# Patient Record
Sex: MALE | Race: WHITE | NOT HISPANIC OR LATINO
[De-identification: names, ages, dates, MRNs, and addresses within clinical notes are randomized per-mention and may not be internally consistent; named-entity substitution may affect disease eponyms.]

---

## 2017-03-21 ENCOUNTER — APPOINTMENT (OUTPATIENT)
Dept: VASCULAR SURGERY | Facility: CLINIC | Age: 52
End: 2017-03-21

## 2017-03-21 VITALS — BODY MASS INDEX: 24.38 KG/M2 | WEIGHT: 180 LBS | HEIGHT: 72 IN

## 2017-03-21 VITALS
SYSTOLIC BLOOD PRESSURE: 131 MMHG | HEART RATE: 66 BPM | DIASTOLIC BLOOD PRESSURE: 79 MMHG | TEMPERATURE: 98.3 F | RESPIRATION RATE: 16 BRPM

## 2017-03-21 DIAGNOSIS — I87.2 VENOUS INSUFFICIENCY (CHRONIC) (PERIPHERAL): ICD-10-CM

## 2017-03-21 DIAGNOSIS — Z78.9 OTHER SPECIFIED HEALTH STATUS: ICD-10-CM

## 2017-03-21 DIAGNOSIS — I86.8 VARICOSE VEINS OF OTHER SPECIFIED SITES: ICD-10-CM

## 2017-03-27 PROBLEM — Z78.9 NON-SMOKER: Status: ACTIVE | Noted: 2017-03-21

## 2017-03-27 PROBLEM — Z78.9 SOCIAL ALCOHOL USE: Status: ACTIVE | Noted: 2017-03-21

## 2017-03-27 PROBLEM — I86.8 VARICOSE VEINS: Status: ACTIVE | Noted: 2017-03-21

## 2017-05-25 ENCOUNTER — APPOINTMENT (OUTPATIENT)
Age: 52
End: 2017-05-25

## 2017-06-08 ENCOUNTER — APPOINTMENT (OUTPATIENT)
Age: 52
End: 2017-06-08

## 2017-06-08 ENCOUNTER — LABORATORY RESULT (OUTPATIENT)
Age: 52
End: 2017-06-08

## 2017-06-08 ENCOUNTER — RESULT REVIEW (OUTPATIENT)
Age: 52
End: 2017-06-08

## 2017-06-15 ENCOUNTER — APPOINTMENT (OUTPATIENT)
Dept: VASCULAR SURGERY | Facility: CLINIC | Age: 52
End: 2017-06-15

## 2017-06-15 DIAGNOSIS — Z98.890 OTHER SPECIFIED POSTPROCEDURAL STATES: ICD-10-CM

## 2017-07-14 ENCOUNTER — APPOINTMENT (OUTPATIENT)
Dept: ORTHOPEDIC SURGERY | Facility: CLINIC | Age: 52
End: 2017-07-14
Payer: COMMERCIAL

## 2017-07-14 VITALS
HEART RATE: 40 BPM | WEIGHT: 180 LBS | BODY MASS INDEX: 24.38 KG/M2 | SYSTOLIC BLOOD PRESSURE: 152 MMHG | DIASTOLIC BLOOD PRESSURE: 84 MMHG | HEIGHT: 72 IN

## 2017-07-14 DIAGNOSIS — M25.519 PAIN IN UNSPECIFIED SHOULDER: ICD-10-CM

## 2017-07-14 DIAGNOSIS — M75.81 OTHER SHOULDER LESIONS, RIGHT SHOULDER: ICD-10-CM

## 2017-07-14 PROCEDURE — 20610 DRAIN/INJ JOINT/BURSA W/O US: CPT | Mod: RT

## 2017-07-14 PROCEDURE — 73030 X-RAY EXAM OF SHOULDER: CPT | Mod: RT

## 2017-07-14 PROCEDURE — 99203 OFFICE O/P NEW LOW 30 MIN: CPT | Mod: 25

## 2018-02-21 ENCOUNTER — OUTPATIENT (OUTPATIENT)
Dept: OUTPATIENT SERVICES | Facility: HOSPITAL | Age: 53
LOS: 1 days | End: 2018-02-21
Payer: COMMERCIAL

## 2018-02-21 DIAGNOSIS — M50.00 CERVICAL DISC DISORDER WITH MYELOPATHY, UNSPECIFIED CERVICAL REGION: ICD-10-CM

## 2018-02-21 PROCEDURE — 72141 MRI NECK SPINE W/O DYE: CPT | Mod: 26

## 2018-02-21 PROCEDURE — 72141 MRI NECK SPINE W/O DYE: CPT

## 2018-03-14 ENCOUNTER — OUTPATIENT (OUTPATIENT)
Dept: OUTPATIENT SERVICES | Facility: HOSPITAL | Age: 53
LOS: 1 days | Discharge: HOME | End: 2018-03-14
Payer: COMMERCIAL

## 2018-03-14 DIAGNOSIS — M54.12 RADICULOPATHY, CERVICAL REGION: ICD-10-CM

## 2018-03-14 PROCEDURE — 62321 NJX INTERLAMINAR CRV/THRC: CPT

## 2018-03-14 PROCEDURE — 77003 FLUOROGUIDE FOR SPINE INJECT: CPT

## 2018-07-24 PROBLEM — I87.2 VENOUS INSUFFICIENCY: Status: ACTIVE | Noted: 2017-03-21

## 2019-06-17 ENCOUNTER — OUTPATIENT (OUTPATIENT)
Dept: OUTPATIENT SERVICES | Facility: HOSPITAL | Age: 54
LOS: 1 days | End: 2019-06-17
Payer: COMMERCIAL

## 2019-06-17 DIAGNOSIS — Z00.00 ENCOUNTER FOR GENERAL ADULT MEDICAL EXAMINATION WITHOUT ABNORMAL FINDINGS: ICD-10-CM

## 2019-06-17 DIAGNOSIS — R10.9 UNSPECIFIED ABDOMINAL PAIN: ICD-10-CM

## 2019-06-17 PROCEDURE — 74177 CT ABD & PELVIS W/CONTRAST: CPT

## 2019-06-17 PROCEDURE — 74177 CT ABD & PELVIS W/CONTRAST: CPT | Mod: 26

## 2019-07-03 ENCOUNTER — OUTPATIENT (OUTPATIENT)
Dept: OUTPATIENT SERVICES | Facility: HOSPITAL | Age: 54
LOS: 1 days | End: 2019-07-03
Payer: COMMERCIAL

## 2019-07-03 DIAGNOSIS — B35.1 TINEA UNGUIUM: ICD-10-CM

## 2019-07-03 LAB
ALT FLD-CCNC: 29 U/L — SIGNIFICANT CHANGE UP (ref 10–45)
AST SERPL-CCNC: 26 U/L — SIGNIFICANT CHANGE UP (ref 10–40)
HCT VFR BLD CALC: 41.5 % — SIGNIFICANT CHANGE UP (ref 39–50)
HGB BLD-MCNC: 13.5 G/DL — SIGNIFICANT CHANGE UP (ref 13–17)
MCHC RBC-ENTMCNC: 30.3 PG — SIGNIFICANT CHANGE UP (ref 27–34)
MCHC RBC-ENTMCNC: 32.5 GM/DL — SIGNIFICANT CHANGE UP (ref 32–36)
MCV RBC AUTO: 93 FL — SIGNIFICANT CHANGE UP (ref 80–100)
PLATELET # BLD AUTO: 266 K/UL — SIGNIFICANT CHANGE UP (ref 150–400)
RBC # BLD: 4.46 M/UL — SIGNIFICANT CHANGE UP (ref 4.2–5.8)
RBC # FLD: 12.7 % — SIGNIFICANT CHANGE UP (ref 10.3–14.5)
WBC # BLD: 5.3 K/UL — SIGNIFICANT CHANGE UP (ref 3.8–10.5)
WBC # FLD AUTO: 5.3 K/UL — SIGNIFICANT CHANGE UP (ref 3.8–10.5)

## 2019-07-03 PROCEDURE — 84460 ALANINE AMINO (ALT) (SGPT): CPT

## 2019-07-03 PROCEDURE — 85027 COMPLETE CBC AUTOMATED: CPT

## 2019-07-03 PROCEDURE — 84450 TRANSFERASE (AST) (SGOT): CPT

## 2020-03-25 ENCOUNTER — TRANSCRIPTION ENCOUNTER (OUTPATIENT)
Age: 55
End: 2020-03-25

## 2021-01-23 ENCOUNTER — TRANSCRIPTION ENCOUNTER (OUTPATIENT)
Age: 56
End: 2021-01-23

## 2021-02-01 ENCOUNTER — INPATIENT (INPATIENT)
Facility: HOSPITAL | Age: 56
LOS: 0 days | Discharge: HOME | DRG: 310 | End: 2021-02-02
Attending: INTERNAL MEDICINE | Admitting: HOSPITALIST
Payer: COMMERCIAL

## 2021-02-01 VITALS
HEIGHT: 66 IN | RESPIRATION RATE: 18 BRPM | OXYGEN SATURATION: 100 % | HEART RATE: 116 BPM | DIASTOLIC BLOOD PRESSURE: 105 MMHG | SYSTOLIC BLOOD PRESSURE: 177 MMHG | TEMPERATURE: 98 F | WEIGHT: 160.06 LBS

## 2021-02-01 RX ORDER — METOPROLOL TARTRATE 50 MG
5 TABLET ORAL ONCE
Refills: 0 | Status: DISCONTINUED | OUTPATIENT
Start: 2021-02-01 | End: 2021-02-01

## 2021-02-01 RX ORDER — DILTIAZEM HCL 120 MG
10 CAPSULE, EXT RELEASE 24 HR ORAL ONCE
Refills: 0 | Status: COMPLETED | OUTPATIENT
Start: 2021-02-01 | End: 2021-02-01

## 2021-02-01 NOTE — ED ADULT TRIAGE NOTE - NS ED NURSE AMBULANCES
[address] [facility] Patient Status:  Surgery Admit - Inpt    1957 MRN [id number]   Location [place] [address] Attending Roma Munroe, *   Hosp Day # 0 PCP Cira Gabriel MD in HPI. Physical Exam:   Vital Signs:  Height 5' 9\" (1.753 m), weight 210 lb (95.3 kg). General appearance: alert, appears stated age and cooperative  Extremities:  On exam he has a slightly antalgic limp on the right side.  He has 15 degrees of int [facility]

## 2021-02-01 NOTE — ED ADULT TRIAGE NOTE - CHIEF COMPLAINT QUOTE
pt co palpitations s/p shoveling snow. Took 2 81mg ASA at home. Given another 2 81mg ASA by EMS. denies cardiac hx.

## 2021-02-02 ENCOUNTER — TRANSCRIPTION ENCOUNTER (OUTPATIENT)
Age: 56
End: 2021-02-02

## 2021-02-02 VITALS — HEART RATE: 48 BPM

## 2021-02-02 DIAGNOSIS — I48.91 UNSPECIFIED ATRIAL FIBRILLATION: ICD-10-CM

## 2021-02-02 DIAGNOSIS — Z98.890 OTHER SPECIFIED POSTPROCEDURAL STATES: Chronic | ICD-10-CM

## 2021-02-02 LAB
ADD ON TEST-SPECIMEN IN LAB: SIGNIFICANT CHANGE UP
ALBUMIN SERPL ELPH-MCNC: 3.9 G/DL — SIGNIFICANT CHANGE UP (ref 3.3–5)
ALP SERPL-CCNC: 35 U/L — LOW (ref 40–120)
ALT FLD-CCNC: 49 U/L — SIGNIFICANT CHANGE UP (ref 12–78)
ANION GAP SERPL CALC-SCNC: 3 MMOL/L — LOW (ref 5–17)
ANION GAP SERPL CALC-SCNC: 8 MMOL/L — SIGNIFICANT CHANGE UP (ref 5–17)
APPEARANCE UR: CLEAR — SIGNIFICANT CHANGE UP
AST SERPL-CCNC: 40 U/L — HIGH (ref 15–37)
BASOPHILS # BLD AUTO: 0.06 K/UL — SIGNIFICANT CHANGE UP (ref 0–0.2)
BASOPHILS NFR BLD AUTO: 1 % — SIGNIFICANT CHANGE UP (ref 0–2)
BILIRUB SERPL-MCNC: 0.3 MG/DL — SIGNIFICANT CHANGE UP (ref 0.2–1.2)
BILIRUB UR-MCNC: NEGATIVE — SIGNIFICANT CHANGE UP
BUN SERPL-MCNC: 22 MG/DL — SIGNIFICANT CHANGE UP (ref 7–23)
BUN SERPL-MCNC: 23 MG/DL — SIGNIFICANT CHANGE UP (ref 7–23)
CALCIUM SERPL-MCNC: 8.3 MG/DL — LOW (ref 8.5–10.1)
CALCIUM SERPL-MCNC: 9.4 MG/DL — SIGNIFICANT CHANGE UP (ref 8.5–10.1)
CHLORIDE SERPL-SCNC: 112 MMOL/L — HIGH (ref 96–108)
CHLORIDE SERPL-SCNC: 112 MMOL/L — HIGH (ref 96–108)
CK SERPL-CCNC: 694 U/L — HIGH (ref 26–308)
CO2 SERPL-SCNC: 25 MMOL/L — SIGNIFICANT CHANGE UP (ref 22–31)
CO2 SERPL-SCNC: 29 MMOL/L — SIGNIFICANT CHANGE UP (ref 22–31)
COLOR SPEC: YELLOW — SIGNIFICANT CHANGE UP
CREAT SERPL-MCNC: 0.97 MG/DL — SIGNIFICANT CHANGE UP (ref 0.5–1.3)
CREAT SERPL-MCNC: 1.04 MG/DL — SIGNIFICANT CHANGE UP (ref 0.5–1.3)
DIFF PNL FLD: NEGATIVE — SIGNIFICANT CHANGE UP
EOSINOPHIL # BLD AUTO: 0.07 K/UL — SIGNIFICANT CHANGE UP (ref 0–0.5)
EOSINOPHIL NFR BLD AUTO: 1.1 % — SIGNIFICANT CHANGE UP (ref 0–6)
GLUCOSE SERPL-MCNC: 101 MG/DL — HIGH (ref 70–99)
GLUCOSE SERPL-MCNC: 120 MG/DL — HIGH (ref 70–99)
GLUCOSE UR QL: NEGATIVE MG/DL — SIGNIFICANT CHANGE UP
HCT VFR BLD CALC: 41.5 % — SIGNIFICANT CHANGE UP (ref 39–50)
HCV AB S/CO SERPL IA: 0.08 S/CO — SIGNIFICANT CHANGE UP (ref 0–0.99)
HCV AB SERPL-IMP: SIGNIFICANT CHANGE UP
HGB BLD-MCNC: 14.2 G/DL — SIGNIFICANT CHANGE UP (ref 13–17)
IMM GRANULOCYTES NFR BLD AUTO: 0.3 % — SIGNIFICANT CHANGE UP (ref 0–1.5)
KETONES UR-MCNC: NEGATIVE — SIGNIFICANT CHANGE UP
LEUKOCYTE ESTERASE UR-ACNC: NEGATIVE — SIGNIFICANT CHANGE UP
LYMPHOCYTES # BLD AUTO: 1.74 K/UL — SIGNIFICANT CHANGE UP (ref 1–3.3)
LYMPHOCYTES # BLD AUTO: 27.8 % — SIGNIFICANT CHANGE UP (ref 13–44)
MAGNESIUM SERPL-MCNC: 2.5 MG/DL — SIGNIFICANT CHANGE UP (ref 1.6–2.6)
MCHC RBC-ENTMCNC: 30.5 PG — SIGNIFICANT CHANGE UP (ref 27–34)
MCHC RBC-ENTMCNC: 34.2 GM/DL — SIGNIFICANT CHANGE UP (ref 32–36)
MCV RBC AUTO: 89.1 FL — SIGNIFICANT CHANGE UP (ref 80–100)
MONOCYTES # BLD AUTO: 0.48 K/UL — SIGNIFICANT CHANGE UP (ref 0–0.9)
MONOCYTES NFR BLD AUTO: 7.7 % — SIGNIFICANT CHANGE UP (ref 2–14)
NEUTROPHILS # BLD AUTO: 3.89 K/UL — SIGNIFICANT CHANGE UP (ref 1.8–7.4)
NEUTROPHILS NFR BLD AUTO: 62.1 % — SIGNIFICANT CHANGE UP (ref 43–77)
NITRITE UR-MCNC: NEGATIVE — SIGNIFICANT CHANGE UP
NT-PROBNP SERPL-SCNC: 45 PG/ML — SIGNIFICANT CHANGE UP (ref 0–125)
PH UR: 7 — SIGNIFICANT CHANGE UP (ref 5–8)
PHOSPHATE SERPL-MCNC: 4.4 MG/DL — SIGNIFICANT CHANGE UP (ref 2.5–4.5)
PLATELET # BLD AUTO: 260 K/UL — SIGNIFICANT CHANGE UP (ref 150–400)
POTASSIUM SERPL-MCNC: 3.4 MMOL/L — LOW (ref 3.5–5.3)
POTASSIUM SERPL-MCNC: 4.6 MMOL/L — SIGNIFICANT CHANGE UP (ref 3.5–5.3)
POTASSIUM SERPL-SCNC: 3.4 MMOL/L — LOW (ref 3.5–5.3)
POTASSIUM SERPL-SCNC: 4.6 MMOL/L — SIGNIFICANT CHANGE UP (ref 3.5–5.3)
PROT SERPL-MCNC: 6.8 GM/DL — SIGNIFICANT CHANGE UP (ref 6–8.3)
PROT UR-MCNC: NEGATIVE MG/DL — SIGNIFICANT CHANGE UP
RBC # BLD: 4.66 M/UL — SIGNIFICANT CHANGE UP (ref 4.2–5.8)
RBC # FLD: 13 % — SIGNIFICANT CHANGE UP (ref 10.3–14.5)
SARS-COV-2 IGG SERPL QL IA: NEGATIVE — SIGNIFICANT CHANGE UP
SARS-COV-2 IGM SERPL IA-ACNC: 0.08 INDEX — SIGNIFICANT CHANGE UP
SARS-COV-2 RNA SPEC QL NAA+PROBE: SIGNIFICANT CHANGE UP
SODIUM SERPL-SCNC: 144 MMOL/L — SIGNIFICANT CHANGE UP (ref 135–145)
SODIUM SERPL-SCNC: 145 MMOL/L — SIGNIFICANT CHANGE UP (ref 135–145)
SP GR SPEC: 1.01 — SIGNIFICANT CHANGE UP (ref 1.01–1.02)
T4 FREE SERPL-MCNC: 0.75 NG/DL — LOW (ref 0.76–1.46)
TROPONIN I SERPL-MCNC: 0.02 NG/ML — SIGNIFICANT CHANGE UP (ref 0.01–0.04)
TROPONIN I SERPL-MCNC: 0.02 NG/ML — SIGNIFICANT CHANGE UP (ref 0.01–0.04)
TROPONIN I SERPL-MCNC: <0.015 NG/ML — SIGNIFICANT CHANGE UP (ref 0.01–0.04)
TSH SERPL-MCNC: 1.79 UU/ML — SIGNIFICANT CHANGE UP (ref 0.34–4.82)
UROBILINOGEN FLD QL: NEGATIVE MG/DL — SIGNIFICANT CHANGE UP
WBC # BLD: 6.26 K/UL — SIGNIFICANT CHANGE UP (ref 3.8–10.5)
WBC # FLD AUTO: 6.26 K/UL — SIGNIFICANT CHANGE UP (ref 3.8–10.5)

## 2021-02-02 PROCEDURE — 84100 ASSAY OF PHOSPHORUS: CPT

## 2021-02-02 PROCEDURE — 93010 ELECTROCARDIOGRAM REPORT: CPT

## 2021-02-02 PROCEDURE — 71045 X-RAY EXAM CHEST 1 VIEW: CPT | Mod: 26

## 2021-02-02 PROCEDURE — 93005 ELECTROCARDIOGRAM TRACING: CPT

## 2021-02-02 PROCEDURE — 36415 COLL VENOUS BLD VENIPUNCTURE: CPT

## 2021-02-02 PROCEDURE — 84443 ASSAY THYROID STIM HORMONE: CPT

## 2021-02-02 PROCEDURE — 86769 SARS-COV-2 COVID-19 ANTIBODY: CPT

## 2021-02-02 PROCEDURE — 84484 ASSAY OF TROPONIN QUANT: CPT

## 2021-02-02 PROCEDURE — 71045 X-RAY EXAM CHEST 1 VIEW: CPT

## 2021-02-02 PROCEDURE — 99236 HOSP IP/OBS SAME DATE HI 85: CPT

## 2021-02-02 PROCEDURE — 83735 ASSAY OF MAGNESIUM: CPT

## 2021-02-02 PROCEDURE — 81003 URINALYSIS AUTO W/O SCOPE: CPT

## 2021-02-02 PROCEDURE — 84439 ASSAY OF FREE THYROXINE: CPT

## 2021-02-02 PROCEDURE — 12345: CPT | Mod: NC

## 2021-02-02 PROCEDURE — 82550 ASSAY OF CK (CPK): CPT

## 2021-02-02 PROCEDURE — 86803 HEPATITIS C AB TEST: CPT

## 2021-02-02 PROCEDURE — 93306 TTE W/DOPPLER COMPLETE: CPT

## 2021-02-02 PROCEDURE — 93306 TTE W/DOPPLER COMPLETE: CPT | Mod: 26

## 2021-02-02 PROCEDURE — 99223 1ST HOSP IP/OBS HIGH 75: CPT

## 2021-02-02 PROCEDURE — 80048 BASIC METABOLIC PNL TOTAL CA: CPT

## 2021-02-02 RX ORDER — ASPIRIN/CALCIUM CARB/MAGNESIUM 324 MG
1 TABLET ORAL
Qty: 0 | Refills: 0 | DISCHARGE

## 2021-02-02 RX ORDER — SODIUM CHLORIDE 9 MG/ML
1000 INJECTION INTRAMUSCULAR; INTRAVENOUS; SUBCUTANEOUS
Refills: 0 | Status: DISCONTINUED | OUTPATIENT
Start: 2021-02-02 | End: 2021-02-02

## 2021-02-02 RX ORDER — POTASSIUM PHOSPHATE, MONOBASIC POTASSIUM PHOSPHATE, DIBASIC 236; 224 MG/ML; MG/ML
15 INJECTION, SOLUTION INTRAVENOUS ONCE
Refills: 0 | Status: COMPLETED | OUTPATIENT
Start: 2021-02-02 | End: 2021-02-02

## 2021-02-02 RX ORDER — ASPIRIN/CALCIUM CARB/MAGNESIUM 324 MG
81 TABLET ORAL DAILY
Refills: 0 | Status: DISCONTINUED | OUTPATIENT
Start: 2021-02-02 | End: 2021-02-02

## 2021-02-02 RX ORDER — FINASTERIDE 5 MG/1
5 TABLET, FILM COATED ORAL DAILY
Refills: 0 | Status: DISCONTINUED | OUTPATIENT
Start: 2021-02-02 | End: 2021-02-02

## 2021-02-02 RX ORDER — POTASSIUM CHLORIDE 20 MEQ
40 PACKET (EA) ORAL ONCE
Refills: 0 | Status: COMPLETED | OUTPATIENT
Start: 2021-02-02 | End: 2021-02-02

## 2021-02-02 RX ADMIN — Medication 40 MILLIEQUIVALENT(S): at 04:19

## 2021-02-02 RX ADMIN — POTASSIUM PHOSPHATE, MONOBASIC POTASSIUM PHOSPHATE, DIBASIC 62.5 MILLIMOLE(S): 236; 224 INJECTION, SOLUTION INTRAVENOUS at 04:33

## 2021-02-02 RX ADMIN — Medication 10 MILLIGRAM(S): at 00:09

## 2021-02-02 RX ADMIN — Medication 81 MILLIGRAM(S): at 12:11

## 2021-02-02 RX ADMIN — SODIUM CHLORIDE 125 MILLILITER(S): 9 INJECTION INTRAMUSCULAR; INTRAVENOUS; SUBCUTANEOUS at 00:14

## 2021-02-02 RX ADMIN — FINASTERIDE 5 MILLIGRAM(S): 5 TABLET, FILM COATED ORAL at 12:11

## 2021-02-02 NOTE — CONSULT NOTE ADULT - SUBJECTIVE AND OBJECTIVE BOX
CHIEF COMPLAINT:    HPI:  57 yo M with a PMH alopecia who presents with chest tightness and palpitations. He had been shoveling the snow at his house this evening for about 15 minutes when he suddenly felt tachycardic. He went inside, felt his pulse, and felt it being irregularly irregular and was concerned he was in atrial fibrillation. He does not have a prior diagnosis of atrial fibrillation or a prior cardiac history. He also felt an "uncomfortable feeling" in his chest, not radiating, that persisted. He took 4 aspirins (81 mg each) and 20 mg Propranolol. After 15 minutes of these symptoms, he decided to call EMS. He denies SOB, cough, fevers, chills, weight changes, rash, swelling, dysuria, hematuria, diarrhea, blood in stool, abdominal pain, nausea, vomiting, headache, or blurry vision.    Of note, he has routine cardiac stress tests and his last one was a year ago and was normal. He exercises on a treadmill regularly and his resting HR is 45-50.    In the ED, he was given Diltiazem 10 mg IV x1 and started on NS at 100 ml/hr. His symptoms did not improve until he received Diltiazem in the ED. (02 Feb 2021 03:01)      PAST MEDICAL & SURGICAL HISTORY:  History of alopecia    Status post phlebectomy    Status post laser ablation of incompetent vein        Allergies    Keflex (Rash)    Intolerances        SOCIAL HISTORY:    FAMILY HISTORY:  Family history of cardiac arrest  mother, age 70s    FH: CAD (coronary artery disease)  not premature    Family history of pancreatic cancer  father    FH: heart disease  parents        MEDICATIONS:  MEDICATIONS  (STANDING):  aspirin enteric coated 81 milliGRAM(s) Oral daily  finasteride 5 milliGRAM(s) Oral daily    MEDICATIONS  (PRN):      REVIEW OF SYSTEMS:    CONSTITUTIONAL: No weakness, fevers or chills  EYES/ENT: No visual changes;  No vertigo or throat pain   NECK: No pain or stiffness  RESPIRATORY: No cough, wheezing, hemoptysis; No shortness of breath  CARDIOVASCULAR: No chest pain or palpitations  GASTROINTESTINAL: No abdominal or epigastric pain. No nausea, vomiting, or hematemesis; No diarrhea or constipation. No melena or hematochezia.  GENITOURINARY: No dysuria, frequency or hematuria  NEUROLOGICAL: No numbness or weakness  SKIN: No itching, burning, rashes, or lesions   All other review of systems is negative unless indicated above    Vital Signs Last 24 Hrs  T(C): 36.4 (02 Feb 2021 06:50), Max: 36.8 (01 Feb 2021 23:47)  T(F): 97.5 (02 Feb 2021 06:50), Max: 98.3 (01 Feb 2021 23:47)  HR: 44 (02 Feb 2021 06:50) (44 - 116)  BP: 129/83 (02 Feb 2021 06:50) (112/73 - 177/105)  BP(mean): 97 (02 Feb 2021 06:50) (97 - 97)  RR: 15 (02 Feb 2021 06:50) (15 - 19)  SpO2: 100% (02 Feb 2021 06:50) (100% - 100%)    I&O's Summary      PHYSICAL EXAM:    Constitutional: NAD, awake and alert, well-developed  HEENT: PERR, EOMI,  No oral cyananosis.  Neck:  supple,  No JVD  Respiratory: Breath sounds are clear bilaterally, No wheezing, rales or rhonchi  Cardiovascular: S1 and S2, regular rate and rhythm, no Murmurs, gallops or rubs  Gastrointestinal: Bowel Sounds present, soft, nontender.   Extremities: No peripheral edema. No clubbing or cyanosis.  Vascular: 2+ peripheral pulses  Neurological: A/O x 3, no focal deficits  Musculoskeletal: no calf tenderness.  Skin: No rashes.      LABS: All Labs Reviewed:                        14.2   6.26  )-----------( 260      ( 02 Feb 2021 00:06 )             41.5     02 Feb 2021 06:45    144    |  112    |  22     ----------------------------<  101    4.6     |  29     |  0.97   02 Feb 2021 00:06    145    |  112    |  23     ----------------------------<  120    3.4     |  25     |  1.04     Ca    8.3        02 Feb 2021 06:45  Ca    9.4        02 Feb 2021 00:06  Phos  4.4       02 Feb 2021 06:45  Phos  2.2       02 Feb 2021 00:06  Mg     2.5       02 Feb 2021 06:45  Mg     2.4       02 Feb 2021 00:06    TPro  6.8    /  Alb  3.9    /  TBili  0.3    /  DBili  x      /  AST  40     /  ALT  49     /  AlkPhos  35     02 Feb 2021 00:06      CARDIAC MARKERS ( 02 Feb 2021 06:45 )  0.018 ng/mL / x     / x     / x     / x      CARDIAC MARKERS ( 02 Feb 2021 03:42 )  0.024 ng/mL / x     / 694 U/L / x     / x      CARDIAC MARKERS ( 02 Feb 2021 00:06 )  <0.015 ng/mL / x     / x     / x     / x          Blood Culture:   02-02 @ 00:06  Pro Bnp 45    02-02 @ 06:45  TSH: 1.79      RADIOLOGY/EKG:         CHIEF COMPLAINT:    HPI:  57 yo M with a PMH alopecia who presents with palpitations. He had been shoveling the snow at his house this evening for about 15 minutes when he suddenly felt tachycardic. He went inside, felt his pulse, and felt it being irregularly irregular and was concerned he was in atrial fibrillation. He does not have a prior diagnosis of atrial fibrillation or a prior cardiac history. He also felt an "uncomfortable feeling" in his chest, not radiating, that persisted. He took 4 aspirins (81 mg each) and 20 mg Propranolol. After 15 minutes of these symptoms, he decided to call EMS. He denies SOB, cough, fevers, chills, weight changes, rash, swelling, dysuria, hematuria, diarrhea, blood in stool, abdominal pain, nausea, vomiting, headache, or blurry vision.    patient says he did not have chest pain , felt palpitations     Of note, he has routine cardiac stress tests and his last one was a year ago and was normal. He exercises on a treadmill regularly and his resting HR is 45-50.    In the ED, he was given Diltiazem 10 mg IV x1 and started on NS at 100 ml/hr. His symptoms did not improve until he received Diltiazem in the ED. (0    Patient remain sinus rhythm  marked bradycardia. patient runs regularly , does drink excessive amounts of caffeine       PAST MEDICAL & SURGICAL HISTORY:  History of alopecia    Status post phlebectomy    Status post laser ablation of incompetent vein        Allergies    Keflex (Rash)    Intolerances        SOCIAL HISTORY: non smoker     FAMILY HISTORY:  Family history of cardiac arrest  mother, age 70s    FH: CAD (coronary artery disease)  not premature    Family history of pancreatic cancer  father    FH: heart disease  parents        MEDICATIONS:  MEDICATIONS  (STANDING):  aspirin enteric coated 81 milliGRAM(s) Oral daily  finasteride 5 milliGRAM(s) Oral daily    MEDICATIONS  (PRN):      REVIEW OF SYSTEMS:    CONSTITUTIONAL: No weakness, fevers or chills  EYES/ENT: No visual changes;  No vertigo or throat pain   NECK: No pain or stiffness  RESPIRATORY: No cough, wheezing, hemoptysis; No shortness of breath  CARDIOVASCULAR: No chest pain or palpitations  GASTROINTESTINAL: No abdominal or epigastric pain. No nausea, vomiting, or hematemesis; No diarrhea or constipation. No melena or hematochezia.  GENITOURINARY: No dysuria, frequency or hematuria  NEUROLOGICAL: No numbness or weakness  SKIN: No itching, burning, rashes, or lesions   All other review of systems is negative unless indicated above    Vital Signs Last 24 Hrs  T(C): 36.4 (02 Feb 2021 06:50), Max: 36.8 (01 Feb 2021 23:47)  T(F): 97.5 (02 Feb 2021 06:50), Max: 98.3 (01 Feb 2021 23:47)  HR: 44 (02 Feb 2021 06:50) (44 - 116)  BP: 129/83 (02 Feb 2021 06:50) (112/73 - 177/105)  BP(mean): 97 (02 Feb 2021 06:50) (97 - 97)  RR: 15 (02 Feb 2021 06:50) (15 - 19)  SpO2: 100% (02 Feb 2021 06:50) (100% - 100%)    I&O's Summary      PHYSICAL EXAM:    Constitutional: NAD, awake and alert, well-developed  HEENT: PERR, EOMI,  No oral cyananosis.  Neck:  supple,  No JVD  Respiratory: Breath sounds are clear bilaterally, No wheezing, rales or rhonchi  Cardiovascular: S1 and S2, regular rate and rhythm,   Gastrointestinal: Bowel Sounds present, soft, nontender.   Extremities: No peripheral edema. No clubbing or cyanosis.  Vascular: 2+ peripheral pulses  Neurological: A/O x 3, no focal deficits  Musculoskeletal: no calf tenderness.  Skin: No rashes.      LABS: All Labs Reviewed:                        14.2   6.26  )-----------( 260      ( 02 Feb 2021 00:06 )             41.5     02 Feb 2021 06:45    144    |  112    |  22     ----------------------------<  101    4.6     |  29     |  0.97   02 Feb 2021 00:06    145    |  112    |  23     ----------------------------<  120    3.4     |  25     |  1.04     Ca    8.3        02 Feb 2021 06:45  Ca    9.4        02 Feb 2021 00:06  Phos  4.4       02 Feb 2021 06:45  Phos  2.2       02 Feb 2021 00:06  Mg     2.5       02 Feb 2021 06:45  Mg     2.4       02 Feb 2021 00:06    TPro  6.8    /  Alb  3.9    /  TBili  0.3    /  DBili  x      /  AST  40     /  ALT  49     /  AlkPhos  35     02 Feb 2021 00:06      CARDIAC MARKERS ( 02 Feb 2021 06:45 )  0.018 ng/mL / x     / x     / x     / x      CARDIAC MARKERS ( 02 Feb 2021 03:42 )  0.024 ng/mL / x     / 694 U/L / x     / x      CARDIAC MARKERS ( 02 Feb 2021 00:06 )  <0.015 ng/mL / x     / x     / x     / x          Blood Culture:   02-02 @ 00:06  Pro Bnp 45    02-02 @ 06:45  TSH: 1.79      RADIOLOGY/EKG:  afib with VRVR 110    2/2/21 sinus bradycardia     ECHO    Normal RV LV function mild TR RVSp 32 mm hg normal atrial size

## 2021-02-02 NOTE — H&P ADULT - NSICDXFAMILYHX_GEN_ALL_CORE_FT
FAMILY HISTORY:  FH: heart disease, parents     FAMILY HISTORY:  Family history of cardiac arrest, mother, age 70s  Family history of pancreatic cancer, father  FH: CAD (coronary artery disease), not premature  FH: heart disease, parents

## 2021-02-02 NOTE — ED PROVIDER NOTE - CLINICAL SUMMARY MEDICAL DECISION MAKING FREE TEXT BOX
pt with cp and palpitations after shoveling found with new onset AF.   will w/u and admit for further treatment and w/u

## 2021-02-02 NOTE — H&P ADULT - ASSESSMENT
55 yo M with a PMH alopecia who presents with chest pain and palpitations, found to have new onset atrial fibrillation. 55 yo M with a PMH alopecia who presents with chest pain and palpitations, found to have new onset atrial fibrillation.    1) New onset atrial fibrillation  - No cardiac history but has extensive family history of CAD (non-premature)  - [id number] of 0, will continue aspirin 81 mg QD for A/C  - Converted to sinus bradycardia during my physical exam; pt states baseline HR ~ 50  - Will hold off AV dany blockers in the setting of bradycardia  - Cardiology consult  - TTE  - Monitor on telemetry  - Trend troponin at least x3  - Check TSH with free T4  - Check UA to r/o UTI as a predisposing factor  - F/u COVID-19 PCR. Patient is LOW suspicion for COVID-19  - Replete and monitor electrolytes as noted below  - Monitor for changes in symptoms    2) Hypokalemia  - 3.4 on admission, mild  - May have contributed to atrial fibrillation  - Replete PO  - Check repeat in AM with Mg    3) Alopecia  - C/w Finasteride 5 mg QD    4) Prophylactic measure  - DVT PPX: IMPROVE score fo 0, no PPX needed  - Diet: regular  - Dispo: pending improvement in sxs and cardiology eval 57 yo M with a PMH alopecia who presents with chest pain and palpitations, found to have new onset atrial fibrillation.    1) New onset atrial fibrillation (with RVR)  - No cardiac history but has extensive family history of CAD (non-premature)  - [id number] of 0, will continue aspirin 81 mg QD for A/C  - Was in RVR, rate improved after IV Diltiazem in ED, then converted to sinus bradycardia during my physical exam; pt states baseline HR ~ 50  - Will hold off AV dany blockers in the setting of bradycardia  - Cardiology consult  - TTE  - Monitor on telemetry  - Trend troponin at least x3  - Check TSH with free T4  - Check UA to r/o UTI as a predisposing factor  - F/u COVID-19 PCR. Patient is LOW suspicion for COVID-19  - Replete and monitor electrolytes as noted below  - Monitor for changes in symptoms    2) Hypokalemia  - 3.4 on admission, mild  - May have contributed to atrial fibrillation  - Replete PO  - Check repeat in AM with Mg    3) Alopecia  - C/w Finasteride 5 mg QD    4) Prophylactic measure  - DVT PPX: IMPROVE score fo 0, no PPX needed  - Diet: regular  - Dispo: pending improvement in sxs and cardiology eval 57 yo M with a PMH alopecia who presents with chest pain and palpitations, found to have new onset atrial fibrillation.    1) New onset atrial fibrillation (with RVR)  - No cardiac history but has extensive family history of CAD (non-premature)  - [id number] of 0, will continue aspirin 81 mg QD for A/C  - Was in RVR, rate improved after IV Diltiazem in ED, then converted to sinus bradycardia during my physical exam; pt states baseline HR ~ 50  - Will hold off AV dany blockers in the setting of bradycardia  - Cardiology consult  - TTE  - Monitor on telemetry  - Trend troponin at least x3  - Check TSH with free T4  - Check UA to r/o UTI as a predisposing factor  - F/u COVID-19 PCR. Patient is LOW suspicion for COVID-19  - Replete and monitor electrolytes as noted below  - Monitor for changes in symptoms  - Mild IV hydration    2) Hypokalemia  - 3.4 on admission, mild  - May have contributed to atrial fibrillation  - Replete PO  - Check repeat in AM with Mg    3) Alopecia  - C/w Finasteride 5 mg QD    4) Prophylactic measure  - DVT PPX: IMPROVE score fo 0, no PPX needed  - Diet: regular  - Dispo: pending improvement in sxs and cardiology eval 57 yo M with a PMH alopecia who presents with chest pain and palpitations, found to have new onset atrial fibrillation.    1) New onset atrial fibrillation (with RVR)  - No cardiac history but has extensive family history of CAD (non-premature)  - [id number] of 0, will continue aspirin 81 mg QD for A/C  - Was in RVR, rate improved after IV Diltiazem in ED, then converted to sinus bradycardia during my physical exam; pt states baseline HR ~ 50  - Will hold off AV dany blockers in the setting of bradycardia  - Cardiology consult  - TTE  - Monitor on telemetry  - Trend troponin at least x3  - Check TSH with free T4  - Check UA to r/o UTI as a predisposing factor  - F/u COVID-19 PCR. Patient is LOW suspicion for COVID-19  - Replete and monitor electrolytes as noted below  - Monitor for changes in symptoms    2) Hypokalemia  - 3.4 on admission, mild  - May have contributed to atrial fibrillation  - Replete PO  - Check repeat in AM with Mg and Phos    3) Alopecia  - C/w Finasteride 5 mg QD    4) Prophylactic measure  - DVT PPX: IMPROVE score fo 0, no PPX needed  - Diet: regular  - Dispo: pending improvement in sxs and cardiology eval

## 2021-02-02 NOTE — H&P ADULT - HISTORY OF PRESENT ILLNESS
57 yo M with a PMH alopecia who presents with chest pain and palpitations.    In the ED, he was given Diltiazem 10 mg IV x1 and started on NS at 100 ml/hr. 57 yo M with a PMH alopecia who presents with chest tightness and palpitations. He had been shoveling the snow at his house this evening for about 15 minutes when he suddenly felt tachycardic. He went inside, felt his pulse, and felt it being irregularly irregular and was concerned he was in atrial fibrillation. He does not have a prior diagnosis of atrial fibrillation or a prior cardiac history. He also felt an "uncomfortable feeling" in his chest, not radiating, that persisted. After 15 minutes of these symptoms, he decided to call EMS. He denies SOB, cough, fevers, chills, weight changes, rash, swelling, dysuria, hematuria, diarrhea, blood in stool, abdominal pain, nausea, vomiting, headache, or blurry vision.    Of note, he has routine cardiac stress tests and his last one was a year ago and was normal. He exercises on a treadmill regularly and his resting HR is 45-50.    In the ED, he was given Diltiazem 10 mg IV x1 and started on NS at 100 ml/hr. His symptoms did not improve until he received Diltiazem in the ED. 57 yo M with a PMH alopecia who presents with chest tightness and palpitations. He had been shoveling the snow at his house this evening for about 15 minutes when he suddenly felt tachycardic. He went inside, felt his pulse, and felt it being irregularly irregular and was concerned he was in atrial fibrillation. He does not have a prior diagnosis of atrial fibrillation or a prior cardiac history. He also felt an "uncomfortable feeling" in his chest, not radiating, that persisted. He took 4 aspirins (81 mg each) and 20 mg Propranolol. After 15 minutes of these symptoms, he decided to call EMS. He denies SOB, cough, fevers, chills, weight changes, rash, swelling, dysuria, hematuria, diarrhea, blood in stool, abdominal pain, nausea, vomiting, headache, or blurry vision.    Of note, he has routine cardiac stress tests and his last one was a year ago and was normal. He exercises on a treadmill regularly and his resting HR is 45-50.    In the ED, he was given Diltiazem 10 mg IV x1 and started on NS at 100 ml/hr. His symptoms did not improve until he received Diltiazem in the ED.

## 2021-02-02 NOTE — ED ADULT NURSE NOTE - OBJECTIVE STATEMENT
c/o cp and palpitations tonight after shoveling snow.   pt denies any previous episodes.   states took ASA PTA.   pt states last stress test negative last year.   pt denies any fever, HA, sob, n/v/d/abd pain.

## 2021-02-02 NOTE — H&P ADULT - NSHPREVIEWOFSYSTEMS_GEN_ALL_CORE
Gen: + malaise. Negative for fevers, chills, weight loss, or weight gain  Eyes: no blurred vision or lacrimation  ENT: no tinnitus, vertigo, or decreased hearing  Resp: no wheezing, dyspnea, pleuritic chest pain, hemoptysis, or orthopnea  CV: + chest discomfort, palpitations. No dyspnea on exertion  GI: no nausea, vomiting, abdominal pain, diarrhea, constipation, melena, or hematochezia  : no dysuria, hematuria, or incontinence  MSK: no arthralgias, joint swelling, or myalgias  Neuro: no focal deficits, confusion, weakness, dizziness, tremors, or seizures  Skin: no rash, lesions, or edema

## 2021-02-02 NOTE — H&P ADULT - NSICDXPASTSURGICALHX_GEN_ALL_CORE_FT
PAST SURGICAL HISTORY:  Status post laser ablation of incompetent vein     Status post phlebectomy

## 2021-02-02 NOTE — DISCHARGE NOTE NURSING/CASE MANAGEMENT/SOCIAL WORK - PATIENT PORTAL LINK FT
You can access the FollowMyHealth Patient Portal offered by [facility] by registering at the following website: [email or web address]. By joining [employer]’s FollowMyHealth portal, you will also be able to view your health information using other applications (apps) compatible with our system.

## 2021-02-02 NOTE — H&P ADULT - NSHPLABSRESULTS_GEN_ALL_CORE
Labs personally reviewed and interpreted. Notable for no leukocytosis (WBC 6.26), left shift, or lymphopenia. Hb 14.2, plt 260, Na 145, K slightly low at 3.4, Cl 112, HCO3 25, BUN/creatinine 23/1.04, , calcium 9.4 with albumin 3.9, alk phos normal at 35, AST minimally elevated at 40, ALT normal at 49, troponin negative x1, and proBNP 45.    CXR personally reviewed and interpreted. Notable for clear lungs, no focal consolidations, effusions, interstitial markings, pneumothorax, or obvious cardiomegaly.    EKG _____________________. No prior EKG for comparison. Labs personally reviewed and interpreted. Notable for no leukocytosis (WBC 6.26), left shift, or lymphopenia. Hb 14.2, plt 260, Na 145, K slightly low at 3.4, Cl 112, HCO3 25, BUN/creatinine 23/1.04, , calcium 9.4 with albumin 3.9, alk phos normal at 35, AST minimally elevated at 40, ALT normal at 49, troponin negative x1, and proBNP 45.    CXR personally reviewed and interpreted. Notable for clear lungs, no focal consolidations, effusions, interstitial markings, pneumothorax, or obvious cardiomegaly.    Labs personally reviewed and interpreted. Notable for no leukocytosis (WBC ), left shift, or lymphopenia. Hb     CXR personally reviewed and interpreted. Notable for clear lungs, no focal consolidations, effusions, interstitial markings, pneumothorax, or obvious cardiomegaly.  CT personally reviewed and interpreted. Notable for    EKG personally reviewed. Atrial fibrillation with RVR, normal axis. Q waves in the lateral limb leads. TWIs in leads aVL and V1-2. Slight ST depressions in V3-6. No ST elevations. No prior EKG for comparison. Rate 110, QTc 470. Labs personally reviewed and interpreted. Notable for no leukocytosis (WBC 6.26), left shift, or lymphopenia. Hb 14.2, plt 260, Na 145, K slightly low at 3.4, Cl 112, HCO3 25, BUN/creatinine 23/1.04, , calcium 9.4 with albumin 3.9, alk phos normal at 35, AST minimally elevated at 40, ALT normal at 49, troponin negative x1, and proBNP 45.  UA pending.  COVID-19 PCR result pending.    CXR personally reviewed and interpreted. Notable for clear lungs, no focal consolidations, effusions, interstitial markings, pneumothorax, or obvious cardiomegaly.    EKG personally reviewed. Atrial fibrillation with RVR, normal axis. Q waves in the lateral limb leads. TWIs in leads aVL and V1-2. Slight ST depressions in V3-6. No ST elevations. No prior EKG for comparison. Rate 110, QTc 470.  Repeat EKG after conversion personally reviewed and interpreted. It shows sinus bradycardia, rate 47, , QTc 415. Unchanged small Q waves in the lateral limb leads. Slight 1/2 mm ST depression in leads III and aVF. Slight < 1mm concave FEDER[place] in V3-4 that does not meet STEMI criteria.

## 2021-02-02 NOTE — ED PROVIDER NOTE - CARE PLAN
Principal Discharge DX:	Atrial fibrillation, unspecified type  Secondary Diagnosis:	Chest pain, unspecified type

## 2021-02-02 NOTE — ED ADULT NURSE REASSESSMENT NOTE - NS ED NURSE REASSESS COMMENT FT1
patient resting in bed comfortably on monitor. sleeping, arousable to voice. alert and oriented x 4 on arousal, perrl, respirations even and unlabored, denies chest pain or discomfort. iv fluids infusing. admitted, awaiting admission orders. call bell within reach. voiding into urinal. no complaints at this time. second troponin at 4am. will continue to monitor.

## 2021-02-02 NOTE — H&P ADULT - NSHPSOCIALHISTORY_GEN_ALL_CORE
No smoking or drug use history.  Social alcohol use.  Lives at home.  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

## 2021-02-02 NOTE — DISCHARGE NOTE PROVIDER - NSDCCPCAREPLAN_GEN_ALL_CORE_FT
PRINCIPAL DISCHARGE DIAGNOSIS  Diagnosis: Atrial fibrillation, unspecified type  Assessment and Plan of Treatment:

## 2021-02-02 NOTE — H&P ADULT - NSHPPHYSICALEXAM_GEN_ALL_CORE
Vital Signs Last 24 Hrs  T(C): 36.8 (01 Feb 2021 23:47), Max: 36.8 (01 Feb 2021 23:47)  T(F): 98.3 (01 Feb 2021 23:47), Max: 98.3 (01 Feb 2021 23:47)  HR: 86 (02 Feb 2021 00:40) (86 - 116)  BP: 117/86 (02 Feb 2021 00:40) (117/86 - 177/105)  BP(mean): --  RR: 18 (02 Feb 2021 00:40) (18 - 18)  SpO2: 100% (02 Feb 2021 00:40) (100% - 100%) Vital Signs Last 24 Hrs  T(C): 36.8 (01 Feb 2021 23:47), Max: 36.8 (01 Feb 2021 23:47)  T(F): 98.3 (01 Feb 2021 23:47), Max: 98.3 (01 Feb 2021 23:47)  HR: 86 (02 Feb 2021 00:40) (86 - 116)  BP: 117/86 (02 Feb 2021 00:40) (117/86 - 177/105)  BP(mean): --  RR: 18 (02 Feb 2021 00:40) (18 - 18)  SpO2: 100% (02 Feb 2021 00:40) (100% - 100%)    GENERAL: No acute distress  HEENT: PERRL, EOMI, MMM, no oropharyngeal lesions  NECK: supple, no stiffness, no JVD, no thyromegaly  PULM: respirations non-labored, clear to auscultation bilaterally, no rales, rhonchi, or wheezes  CV: irregular rate and rhythm, no murmurs, gallops, or rubs  GI: abdomen soft, nontender, nondistended, no masses felt, normal bowel sounds  MSK: no joint swelling, erythema, or warmth  LYMPH: no anterior cervical, posterior cervical, or supraclavicular lymphadenopathy  NEURO: A&Ox3, no tremors, sensation intact  SKIN: no rashes, lesions, or edema  **Of note, patient converted from atrial fibrillation to sinus rhythm

## 2021-02-02 NOTE — CONSULT NOTE ADULT - PROBLEM SELECTOR RECOMMENDATION 9
PAF first episode , possibly due to ? excessive caffeine ,? normal echo ,  , resting sinus bradycardia precludes use of BB , continue eco , CHADVASC score 0   advised the patient to follow up with his cardiologist  , stress test ,   avoid excessive caffeine intake .

## 2021-02-02 NOTE — DISCHARGE NOTE PROVIDER - NSDCMRMEDTOKEN_GEN_ALL_CORE_FT
aspirin 81 mg oral delayed release tablet: 1 tab(s) orally once a day  finasteride 5 mg oral tablet: 1 tab(s) orally once a day

## 2021-02-02 NOTE — DISCHARGE NOTE PROVIDER - HOSPITAL COURSE
History of Present Illness:   57 yo M with a PMH alopecia who presents with chest tightness and palpitations. He had been shoveling the snow at his house this evening for about 15 minutes when he suddenly felt tachycardic. He went inside, felt his pulse, and felt it being irregularly irregular and was concerned he was in atrial fibrillation. He does not have a prior diagnosis of atrial fibrillation or a prior cardiac history. He also felt an "uncomfortable feeling" in his chest, not radiating, that persisted. He took 4 aspirins (81 mg each) and 20 mg Propranolol. After 15 minutes of these symptoms, he decided to call EMS. He denies SOB, cough, fevers, chills, weight changes, rash, swelling, dysuria, hematuria, diarrhea, blood in stool, abdominal pain, nausea, vomiting, headache, or blurry vision.In the ED, he was given Diltiazem 10 mg IV x1 and started on NS at 100 ml/hr. His symptoms did not improve until he received Diltiazem in the ED.    2.2: tele: sinus yu, no cp, no sob            REVIEW OF SYSTEMS:    CONSTITUTIONAL: No weakness, No fevers or chills  ENT: No ear ache, No sorethroat  NECK: No pain, No stiffness  RESPIRATORY: No cough, No wheezing, No hemoptysis; No dyspnea  CARDIOVASCULAR: No chest pain, No palpitations  GASTROINTESTINAL: No abd pain, No nausea, No vomiting, No hematemesis, No diarrhea or constipation. No melena, No hematochezia.  GENITOURINARY: No dysuria, No  hematuria  NEUROLOGICAL: No diplopia, No paresthesia, No motor dysfunction  MUSCULOSKELETAL: No arthralgia, No myalgia  SKIN: No rashes, or lesions   PSYCH: no anxiety, no suicidal ideation    All other review of systems is negative unless indicated above    Vital Signs Last 24 Hrs  T(C): 36.4 (02 Feb 2021 06:50), Max: 36.8 (01 Feb 2021 23:47)  T(F): 97.5 (02 Feb 2021 06:50), Max: 98.3 (01 Feb 2021 23:47)  HR: 48 (02 Feb 2021 08:28) (44 - 116)  BP: 129/83 (02 Feb 2021 06:50) (112/73 - 177/105)  BP(mean): 97 (02 Feb 2021 06:50) (97 - 97)  RR: 15 (02 Feb 2021 06:50) (15 - 19)  SpO2: 100% (02 Feb 2021 06:50) (100% - 100%)    PHYSICAL EXAM:    GENERAL: NAD, Well nourished  HEENT:  NC/AT, EOMI, PERRLA, No scleral icterus, Moist mucous membranes  NECK: Supple, No JVD  CNS:  Alert & Oriented X3, Motor Strength 5/5 B/L upper and lower extremities; DTRs 2+ intact   LUNG: Normal Breath sounds, Clear to auscultation bilaterally, No rales, No rhonchi, No wheezing  HEART: RRR; No murmurs, No rubs  ABDOMEN: +BS, ST/ND/NT  GENITOURINARY: Voiding, Bladder not distended  EXTREMITIES:  2+ Peripheral Pulses, No clubbing, No cyanosis, No tibial edema  MUSCULOSKELTAL: Joints normal ROM, No TTP, No effusion  VAGINAL: deferred  SKIN: no rashes  RECTAL: deferred, not indicated  BREAST: deferred      all labs reviewed  all imaging reviewed    1. New onset paroxysmal Afib: resolved after AVN blockers   c/w ASA  decrease Caffeine intake  Echo: normal EF  Cannot tolerate AVN blockade due to bradycardia  Will need a stress test as outpt  will f/u with cardiology as outpt    dc planning, time 35m

## 2021-02-02 NOTE — ED PROVIDER NOTE - OBJECTIVE STATEMENT
55 y/o male in ED c/o cp and palpitations tonight after shoveling snow.   pt denies any previous episodes.   states took ASA PTA.   pt states last stress test negative last year.   pt denies any fever, HA, sob, n/v/d/abd pain.   tolerating PO.  no sick contacts or recent travel.   states no h/o COVID and has had both dose of the vaccine.

## 2021-02-05 DIAGNOSIS — Z80.0 FAMILY HISTORY OF MALIGNANT NEOPLASM OF DIGESTIVE ORGANS: ICD-10-CM

## 2021-02-05 DIAGNOSIS — L65.9 NONSCARRING HAIR LOSS, UNSPECIFIED: ICD-10-CM

## 2021-02-05 DIAGNOSIS — Z83.3 FAMILY HISTORY OF DIABETES MELLITUS: ICD-10-CM

## 2021-02-05 DIAGNOSIS — E87.6 HYPOKALEMIA: ICD-10-CM

## 2021-02-05 DIAGNOSIS — I48.0 PAROXYSMAL ATRIAL FIBRILLATION: ICD-10-CM

## 2021-02-05 DIAGNOSIS — Z79.82 LONG TERM (CURRENT) USE OF ASPIRIN: ICD-10-CM

## 2023-11-15 PROBLEM — Z87.898 PERSONAL HISTORY OF OTHER SPECIFIED CONDITIONS: Chronic | Status: ACTIVE | Noted: 2021-02-02

## 2023-11-30 ENCOUNTER — OUTPATIENT (OUTPATIENT)
Dept: OUTPATIENT SERVICES | Facility: HOSPITAL | Age: 58
LOS: 1 days | End: 2023-11-30
Payer: COMMERCIAL

## 2023-11-30 ENCOUNTER — TRANSCRIPTION ENCOUNTER (OUTPATIENT)
Age: 58
End: 2023-11-30

## 2023-11-30 VITALS
WEIGHT: 179.9 LBS | OXYGEN SATURATION: 99 % | RESPIRATION RATE: 20 BRPM | HEIGHT: 71 IN | HEART RATE: 52 BPM | DIASTOLIC BLOOD PRESSURE: 72 MMHG | TEMPERATURE: 100 F | SYSTOLIC BLOOD PRESSURE: 144 MMHG

## 2023-11-30 VITALS
RESPIRATION RATE: 18 BRPM | SYSTOLIC BLOOD PRESSURE: 143 MMHG | OXYGEN SATURATION: 100 % | HEART RATE: 50 BPM | DIASTOLIC BLOOD PRESSURE: 79 MMHG

## 2023-11-30 DIAGNOSIS — Z98.890 OTHER SPECIFIED POSTPROCEDURAL STATES: Chronic | ICD-10-CM

## 2023-11-30 DIAGNOSIS — R10.33 PERIUMBILICAL PAIN: ICD-10-CM

## 2023-11-30 DIAGNOSIS — Z86.010 PERSONAL HISTORY OF COLONIC POLYPS: ICD-10-CM

## 2023-11-30 PROCEDURE — 43239 EGD BIOPSY SINGLE/MULTIPLE: CPT

## 2023-11-30 PROCEDURE — 88305 TISSUE EXAM BY PATHOLOGIST: CPT

## 2023-11-30 PROCEDURE — 88342 IMHCHEM/IMCYTCHM 1ST ANTB: CPT | Mod: 26

## 2023-11-30 PROCEDURE — 45385 COLONOSCOPY W/LESION REMOVAL: CPT | Mod: PT

## 2023-11-30 PROCEDURE — 88341 IMHCHEM/IMCYTCHM EA ADD ANTB: CPT

## 2023-11-30 PROCEDURE — 88305 TISSUE EXAM BY PATHOLOGIST: CPT | Mod: 26

## 2023-11-30 DEVICE — NET RETRV ROT ROTH 2.5MMX230CM: Type: IMPLANTABLE DEVICE | Status: FUNCTIONAL

## 2023-11-30 RX ORDER — SODIUM CHLORIDE 9 MG/ML
500 INJECTION INTRAMUSCULAR; INTRAVENOUS; SUBCUTANEOUS
Refills: 0 | Status: COMPLETED | OUTPATIENT
Start: 2023-11-30 | End: 2023-11-30

## 2023-11-30 RX ADMIN — SODIUM CHLORIDE 75 MILLILITER(S): 9 INJECTION INTRAMUSCULAR; INTRAVENOUS; SUBCUTANEOUS at 09:30

## 2023-11-30 NOTE — ASU PATIENT PROFILE, ADULT - FALL HARM RISK - UNIVERSAL INTERVENTIONS
Bed in lowest position, wheels locked, appropriate side rails in place/Call bell, personal items and telephone in reach/Instruct patient to call for assistance before getting out of bed or chair/Non-slip footwear when patient is out of bed/[place] to call system/Physically safe environment - no spills, clutter or unnecessary equipment/Purposeful Proactive Rounding/Room/bathroom lighting operational, light cord in reach

## 2023-11-30 NOTE — ASU DISCHARGE PLAN (ADULT/PEDIATRIC) - ASU DC SPECIAL INSTRUCTIONSFT
I will call you Dec 6 (Wednesday afternoon with the pathology results)  Basically normal EGD EUS and small sigmoid polyp (likely hyperplastic).

## 2023-11-30 NOTE — PRE PROCEDURE NOTE - PRE PROCEDURE EVALUATION
Attending Physician:     Yinka Burrows MD                       Procedure:  EGD EUS COLONOSCOPY    Indication for Procedure:  dyspepsia, abdominal pain. surveillance  ________________________________________________________  PAST MEDICAL & SURGICAL HISTORY:  History of alopecia      Atrial fibrillation      Status post laser ablation of incompetent vein      Status post phlebectomy        ALLERGIES:  Keflex (Rash)    HOME MEDICATIONS:  aspirin 81 mg oral delayed release tablet: 1 tab(s) orally once a day  finasteride 5 mg oral tablet: 1 tab(s) orally once a day    AICD/PPM: [ ] yes   [x ] no    PERTINENT LAB DATA:                      PHYSICAL EXAMINATION:    Height (cm): 180.3  Weight (kg): 81.6  BMI (kg/m2): 25.1  BSA (m2): 2.02T(C): 38  HR: 52  BP: 144/72  RR: 20  SpO2: 99%    Constitutional: NAD  HEENT: PERRLA, EOMI,    Neck:  No JVD  Respiratory: CTAB/L  Cardiovascular: S1 and S2  Gastrointestinal: BS+, soft, NT/ND  Extremities: No peripheral edema  Neurological: A/O x 3, no focal deficits  Psychiatric: Normal mood, normal affect  Skin: No rashes    ASA Class: I [ ]  II [x ]  III [ ]  IV [ ]    COMMENTS:    The patient is a suitable candidate for the planned procedure unless box checked [ ]  No, explain:

## 2023-11-30 NOTE — ASU PREOP CHECKLIST - HEIGHT IN INCHES
Rest, drink plenty of fluids.  Clear liquid diet for 24 hours and advance as tolerated to a bland diet to symptoms improve.  Take your meds as prescribed.  Follow-up with Dr. Jones in 2 days for further evaluation and treatment.  Follow-up with Dr. Cannon as scheduled for further evaluation and treatment.  Return to the emergency department for any acutely worsening abdominal pain, persistent vomiting, or any new or worse concerns.   11

## 2023-11-30 NOTE — ASU PREOP CHECKLIST - LAST TOOK
clears Bactrim Counseling:  I discussed with the patient the risks of sulfa antibiotics including but not limited to GI upset, allergic reaction, drug rash, diarrhea, dizziness, photosensitivity, and yeast infections.  Rarely, more serious reactions can occur including but not limited to aplastic anemia, agranulocytosis, methemoglobinemia, blood dyscrasias, liver or kidney failure, lung infiltrates or desquamative/blistering drug rashes.

## 2023-12-04 LAB
SURGICAL PATHOLOGY STUDY: SIGNIFICANT CHANGE UP
SURGICAL PATHOLOGY STUDY: SIGNIFICANT CHANGE UP

## 2023-12-12 PROBLEM — I48.91 UNSPECIFIED ATRIAL FIBRILLATION: Chronic | Status: ACTIVE | Noted: 2023-11-30

## 2023-12-16 ENCOUNTER — APPOINTMENT (OUTPATIENT)
Dept: RADIOLOGY | Facility: CLINIC | Age: 58
End: 2023-12-16
Payer: COMMERCIAL

## 2023-12-16 ENCOUNTER — APPOINTMENT (OUTPATIENT)
Dept: RADIOLOGY | Facility: CLINIC | Age: 58
End: 2023-12-16

## 2023-12-16 ENCOUNTER — APPOINTMENT (OUTPATIENT)
Dept: MRI IMAGING | Facility: CLINIC | Age: 58
End: 2023-12-16

## 2023-12-16 ENCOUNTER — OUTPATIENT (OUTPATIENT)
Dept: OUTPATIENT SERVICES | Facility: HOSPITAL | Age: 58
LOS: 1 days | End: 2023-12-16
Payer: COMMERCIAL

## 2023-12-16 DIAGNOSIS — Z98.890 OTHER SPECIFIED POSTPROCEDURAL STATES: Chronic | ICD-10-CM

## 2023-12-16 DIAGNOSIS — Z00.8 ENCOUNTER FOR OTHER GENERAL EXAMINATION: ICD-10-CM

## 2023-12-16 PROCEDURE — 73552 X-RAY EXAM OF FEMUR 2/>: CPT

## 2023-12-16 PROCEDURE — 73552 X-RAY EXAM OF FEMUR 2/>: CPT | Mod: 26,LT

## 2023-12-16 PROCEDURE — 73721 MRI JNT OF LWR EXTRE W/O DYE: CPT | Mod: 26,LT

## 2023-12-16 PROCEDURE — 73721 MRI JNT OF LWR EXTRE W/O DYE: CPT

## 2024-01-02 ENCOUNTER — NON-APPOINTMENT (OUTPATIENT)
Age: 59
End: 2024-01-02

## 2024-01-03 ENCOUNTER — NON-APPOINTMENT (OUTPATIENT)
Age: 59
End: 2024-01-03

## 2024-01-03 ENCOUNTER — APPOINTMENT (OUTPATIENT)
Dept: ORTHOPEDIC SURGERY | Facility: CLINIC | Age: 59
End: 2024-01-03
Payer: COMMERCIAL

## 2024-01-03 VITALS
SYSTOLIC BLOOD PRESSURE: 143 MMHG | BODY MASS INDEX: 25.2 KG/M2 | TEMPERATURE: 97.2 F | HEIGHT: 71 IN | DIASTOLIC BLOOD PRESSURE: 83 MMHG | WEIGHT: 180 LBS | HEART RATE: 48 BPM | OXYGEN SATURATION: 98 %

## 2024-01-03 DIAGNOSIS — S83.241A OTHER TEAR OF MEDIAL MENISCUS, CURRENT INJURY, RIGHT KNEE, INITIAL ENCOUNTER: ICD-10-CM

## 2024-01-03 PROCEDURE — 99203 OFFICE O/P NEW LOW 30 MIN: CPT

## 2024-01-04 ENCOUNTER — NON-APPOINTMENT (OUTPATIENT)
Age: 59
End: 2024-01-04

## 2024-01-10 ENCOUNTER — TRANSCRIPTION ENCOUNTER (OUTPATIENT)
Age: 59
End: 2024-01-10

## 2024-01-10 ENCOUNTER — OUTPATIENT (OUTPATIENT)
Dept: INPATIENT UNIT | Facility: HOSPITAL | Age: 59
LOS: 1 days | End: 2024-01-10
Payer: COMMERCIAL

## 2024-01-10 VITALS
HEART RATE: 48 BPM | DIASTOLIC BLOOD PRESSURE: 77 MMHG | OXYGEN SATURATION: 100 % | SYSTOLIC BLOOD PRESSURE: 147 MMHG | RESPIRATION RATE: 16 BRPM | WEIGHT: 179.9 LBS | HEIGHT: 71 IN | TEMPERATURE: 98 F

## 2024-01-10 VITALS
HEART RATE: 48 BPM | OXYGEN SATURATION: 100 % | RESPIRATION RATE: 17 BRPM | SYSTOLIC BLOOD PRESSURE: 135 MMHG | DIASTOLIC BLOOD PRESSURE: 74 MMHG

## 2024-01-10 DIAGNOSIS — Z98.890 OTHER SPECIFIED POSTPROCEDURAL STATES: Chronic | ICD-10-CM

## 2024-01-10 DIAGNOSIS — R55 SYNCOPE AND COLLAPSE: ICD-10-CM

## 2024-01-10 PROCEDURE — 33285 INSJ SUBQ CAR RHYTHM MNTR: CPT

## 2024-01-10 PROCEDURE — C1764: CPT

## 2024-01-10 RX ORDER — FINASTERIDE 5 MG/1
1 TABLET, FILM COATED ORAL
Refills: 0 | DISCHARGE

## 2024-01-10 RX ORDER — ROSUVASTATIN CALCIUM 5 MG/1
1 TABLET ORAL
Refills: 0 | DISCHARGE

## 2024-01-10 RX ORDER — FINASTERIDE 5 MG/1
1 TABLET, FILM COATED ORAL
Qty: 0 | Refills: 0 | DISCHARGE

## 2024-01-10 NOTE — ASU DISCHARGE PLAN (ADULT/PEDIATRIC) - PROVIDER TOKENS
FREE:[LAST:[[facility]],PHONE:[phone]],FAX:[(   )    -],ADDRESS:[[address] [place] [address]],SCHEDULEDAPPT:[01/23/2024],SCHEDULEDAPPTTIME:[09:40 AM],ESTABLISHEDPATIENT:[T]] FREE:[LAST:[[facility]],PHONE:[phone]],FAX:[(   )    -],ADDRESS:[[address] [place] [address]],SCHEDULEDAPPT:[01/23/2024],SCHEDULEDAPPTTIME:[09:40 AM],ESTABLISHEDPATIENT:[T]]

## 2024-01-10 NOTE — ASU DISCHARGE PLAN (ADULT/PEDIATRIC) - CARE PROVIDER_API CALL
[facility],   [address],   [place] [address]  Phone: [phone]  Fax: (   )    -  Established Patient  Scheduled Appointment: 01/23/2024 09:40 AM   [facility],   [address],   [place] [address]  Phone: [phone]  Fax: (   )    -  Established Patient  Scheduled Appointment: 01/23/2024 09:40 AM

## 2024-01-10 NOTE — ASU DISCHARGE PLAN (ADULT/PEDIATRIC) - NS MD DC FALL RISK RISK
For information on Fall & Injury Prevention, visit: [email or web address] OR  [email or web address] OR  https://www.cdc.gov/steadi/patient.html For information on Fall & Injury Prevention, visit: [email or web address]://[email or web address] OR  [email or web address]://[email or web address] OR  https://www.cdc.gov/steadi/patient.html

## 2024-01-10 NOTE — CHART NOTE - NSCHARTNOTEFT_GEN_A_CORE
58 year old man with PMHx of HLD, PAF presents with an episode of AF 2 years ago when after shovelling snow and caffeine intake to [facility]. This spontaneously reverted after 3 - 4 hours. Post DC he wore a BIOTEL with no arrhythymia. Subsequently he has had intermittent episodes of nearsyncope as well as sensations of missed beats. Pt presents today for ILR. 58 year old man with PMHx of HLD, PAF presents with an episode of AF 2 years ago when after shovelling snow and caffeine intake to [facility]. This spontaneously reverted after 3 - 4 hours. Post DC he wore a BIOTEL with no arrhythymia. Subsequently he has had intermittent episodes of nearsyncope as well as sensations of missed beats. Pt presents today for ILR. 58 year old man with PMHx of HLD, PAF presents with an episode of AF 2 years ago when after shovelling snow and caffeine intake to [facility]. This spontaneously reverted after 3 - 4 hours. Post DC he wore a BIOTEL with no arrhythymia. Subsequently he has had intermittent episodes of nearsyncope as well as sensations of missed beats, and palpitations. Pt presents today for ILR. 58 year old man with PMHx of HLD, PAF presents with an episode of AF 2 years ago when after shovelling snow and caffeine intake to [facility]. This spontaneously reverted after 3 - 4 hours. Post DC he wore a BIOTEL with no arrhythymia. Subsequently he has had intermittent episodes of nearsyncope as well as sensations of missed beats, and palpitations. Pt presents today for ILR.

## 2024-01-22 ENCOUNTER — RESULT CHARGE (OUTPATIENT)
Age: 59
End: 2024-01-22

## 2024-01-23 ENCOUNTER — NON-APPOINTMENT (OUTPATIENT)
Age: 59
End: 2024-01-23

## 2024-01-23 ENCOUNTER — APPOINTMENT (OUTPATIENT)
Dept: ELECTROPHYSIOLOGY | Facility: CLINIC | Age: 59
End: 2024-01-23
Payer: COMMERCIAL

## 2024-01-23 VITALS
WEIGHT: 180 LBS | HEIGHT: 71 IN | OXYGEN SATURATION: 100 % | DIASTOLIC BLOOD PRESSURE: 78 MMHG | SYSTOLIC BLOOD PRESSURE: 137 MMHG | HEART RATE: 51 BPM | BODY MASS INDEX: 25.2 KG/M2

## 2024-01-23 DIAGNOSIS — R55 SYNCOPE AND COLLAPSE: ICD-10-CM

## 2024-01-23 PROCEDURE — 93000 ELECTROCARDIOGRAM COMPLETE: CPT | Mod: 59

## 2024-01-23 PROCEDURE — 99213 OFFICE O/P EST LOW 20 MIN: CPT

## 2024-01-23 PROCEDURE — 93291 INTERROG DEV EVAL SCRMS IP: CPT

## 2024-01-24 ENCOUNTER — APPOINTMENT (OUTPATIENT)
Dept: MRI IMAGING | Facility: CLINIC | Age: 59
End: 2024-01-24
Payer: COMMERCIAL

## 2024-01-24 ENCOUNTER — OUTPATIENT (OUTPATIENT)
Dept: OUTPATIENT SERVICES | Facility: HOSPITAL | Age: 59
LOS: 1 days | End: 2024-01-24
Payer: COMMERCIAL

## 2024-01-24 DIAGNOSIS — S83.241A OTHER TEAR OF MEDIAL MENISCUS, CURRENT INJURY, RIGHT KNEE, INITIAL ENCOUNTER: ICD-10-CM

## 2024-01-24 DIAGNOSIS — Z98.890 OTHER SPECIFIED POSTPROCEDURAL STATES: Chronic | ICD-10-CM

## 2024-01-24 DIAGNOSIS — Z00.8 ENCOUNTER FOR OTHER GENERAL EXAMINATION: ICD-10-CM

## 2024-01-24 PROCEDURE — 73721 MRI JNT OF LWR EXTRE W/O DYE: CPT | Mod: 26,LT

## 2024-01-24 PROCEDURE — 73721 MRI JNT OF LWR EXTRE W/O DYE: CPT

## 2024-01-31 ENCOUNTER — APPOINTMENT (OUTPATIENT)
Dept: ORTHOPEDIC SURGERY | Facility: CLINIC | Age: 59
End: 2024-01-31
Payer: COMMERCIAL

## 2024-01-31 VITALS
WEIGHT: 180 LBS | HEART RATE: 49 BPM | BODY MASS INDEX: 25.2 KG/M2 | HEIGHT: 71 IN | SYSTOLIC BLOOD PRESSURE: 134 MMHG | DIASTOLIC BLOOD PRESSURE: 71 MMHG

## 2024-01-31 DIAGNOSIS — M65.9 SYNOVITIS AND TENOSYNOVITIS, UNSPECIFIED: ICD-10-CM

## 2024-01-31 DIAGNOSIS — S83.222D PERIPHERAL TEAR OF MEDIAL MENISCUS, CURRENT INJURY, LEFT KNEE, SUBSEQUENT ENCOUNTER: ICD-10-CM

## 2024-01-31 PROCEDURE — 20610 DRAIN/INJ JOINT/BURSA W/O US: CPT | Mod: LT

## 2024-01-31 PROCEDURE — 99213 OFFICE O/P EST LOW 20 MIN: CPT | Mod: 25

## 2024-02-27 ENCOUNTER — NON-APPOINTMENT (OUTPATIENT)
Age: 59
End: 2024-02-27

## 2024-02-27 ENCOUNTER — APPOINTMENT (OUTPATIENT)
Dept: ELECTROPHYSIOLOGY | Facility: CLINIC | Age: 59
End: 2024-02-27
Payer: COMMERCIAL

## 2024-02-27 PROCEDURE — 93298 REM INTERROG DEV EVAL SCRMS: CPT

## 2024-03-27 ENCOUNTER — TRANSCRIPTION ENCOUNTER (OUTPATIENT)
Age: 59
End: 2024-03-27

## 2024-04-02 ENCOUNTER — NON-APPOINTMENT (OUTPATIENT)
Age: 59
End: 2024-04-02

## 2024-04-02 ENCOUNTER — APPOINTMENT (OUTPATIENT)
Dept: ELECTROPHYSIOLOGY | Facility: CLINIC | Age: 59
End: 2024-04-02
Payer: COMMERCIAL

## 2024-04-02 PROCEDURE — 93298 REM INTERROG DEV EVAL SCRMS: CPT

## 2024-04-12 ENCOUNTER — APPOINTMENT (OUTPATIENT)
Dept: ORTHOPEDIC SURGERY | Facility: CLINIC | Age: 59
End: 2024-04-12
Payer: COMMERCIAL

## 2024-04-12 VITALS — WEIGHT: 180 LBS | HEIGHT: 71 IN | BODY MASS INDEX: 25.2 KG/M2

## 2024-04-12 DIAGNOSIS — M77.12 LATERAL EPICONDYLITIS, LEFT ELBOW: ICD-10-CM

## 2024-04-12 DIAGNOSIS — M23.204 DERANGEMENT OF UNSPECIFIED MEDIAL MENISCUS DUE TO OLD TEAR OR INJURY, LEFT KNEE: ICD-10-CM

## 2024-04-12 DIAGNOSIS — M25.562 PAIN IN LEFT KNEE: ICD-10-CM

## 2024-04-12 DIAGNOSIS — M77.8 OTHER ENTHESOPATHIES, NOT ELSEWHERE CLASSIFIED: ICD-10-CM

## 2024-04-12 PROCEDURE — 20610 DRAIN/INJ JOINT/BURSA W/O US: CPT | Mod: LT

## 2024-04-12 PROCEDURE — 99213 OFFICE O/P EST LOW 20 MIN: CPT | Mod: 25

## 2024-04-24 NOTE — DISCHARGE NOTE PROVIDER - EXTENDED VTE YES NO FOR MLM ENOXAPARIN
HCC coding opportunities          Chart Reviewed number of suggestions sent to Provider: 1     Patients Insurance   I11.0  Medicare Insurance: Medicare          
,

## 2024-05-07 ENCOUNTER — APPOINTMENT (OUTPATIENT)
Dept: ELECTROPHYSIOLOGY | Facility: CLINIC | Age: 59
End: 2024-05-07
Payer: COMMERCIAL

## 2024-05-07 ENCOUNTER — NON-APPOINTMENT (OUTPATIENT)
Age: 59
End: 2024-05-07

## 2024-05-07 PROCEDURE — 93298 REM INTERROG DEV EVAL SCRMS: CPT

## 2024-05-15 ENCOUNTER — APPOINTMENT (OUTPATIENT)
Dept: CT IMAGING | Facility: CLINIC | Age: 59
End: 2024-05-15
Payer: COMMERCIAL

## 2024-05-15 ENCOUNTER — OUTPATIENT (OUTPATIENT)
Dept: OUTPATIENT SERVICES | Facility: HOSPITAL | Age: 59
LOS: 1 days | End: 2024-05-15
Payer: COMMERCIAL

## 2024-05-15 DIAGNOSIS — Z98.890 OTHER SPECIFIED POSTPROCEDURAL STATES: Chronic | ICD-10-CM

## 2024-05-15 DIAGNOSIS — Z00.8 ENCOUNTER FOR OTHER GENERAL EXAMINATION: ICD-10-CM

## 2024-05-15 PROCEDURE — 75571 CT HRT W/O DYE W/CA TEST: CPT | Mod: 26

## 2024-05-15 PROCEDURE — 75571 CT HRT W/O DYE W/CA TEST: CPT

## 2024-05-16 ENCOUNTER — TRANSCRIPTION ENCOUNTER (OUTPATIENT)
Age: 59
End: 2024-05-16

## 2024-06-11 ENCOUNTER — APPOINTMENT (OUTPATIENT)
Dept: ELECTROPHYSIOLOGY | Facility: CLINIC | Age: 59
End: 2024-06-11
Payer: COMMERCIAL

## 2024-06-11 ENCOUNTER — NON-APPOINTMENT (OUTPATIENT)
Age: 59
End: 2024-06-11

## 2024-06-11 PROCEDURE — 93298 REM INTERROG DEV EVAL SCRMS: CPT

## 2024-07-09 ENCOUNTER — APPOINTMENT (OUTPATIENT)
Dept: ELECTROPHYSIOLOGY | Facility: CLINIC | Age: 59
End: 2024-07-09
Payer: COMMERCIAL

## 2024-07-09 ENCOUNTER — NON-APPOINTMENT (OUTPATIENT)
Age: 59
End: 2024-07-09

## 2024-07-09 VITALS
HEART RATE: 50 BPM | SYSTOLIC BLOOD PRESSURE: 119 MMHG | DIASTOLIC BLOOD PRESSURE: 73 MMHG | OXYGEN SATURATION: 100 % | BODY MASS INDEX: 25.11 KG/M2 | WEIGHT: 180 LBS

## 2024-07-09 DIAGNOSIS — I48.0 PAROXYSMAL ATRIAL FIBRILLATION: ICD-10-CM

## 2024-07-09 DIAGNOSIS — E78.5 HYPERLIPIDEMIA, UNSPECIFIED: ICD-10-CM

## 2024-07-09 PROCEDURE — 99215 OFFICE O/P EST HI 40 MIN: CPT

## 2024-07-09 PROCEDURE — 93291 INTERROG DEV EVAL SCRMS IP: CPT

## 2024-07-09 RX ORDER — ROSUVASTATIN CALCIUM 5 MG/1
5 TABLET, FILM COATED ORAL DAILY
Refills: 0 | Status: ACTIVE | COMMUNITY

## 2024-07-09 RX ORDER — PROPRANOLOL HCL 10 MG
10 TABLET ORAL TWICE DAILY
Refills: 0 | Status: ACTIVE | COMMUNITY

## 2024-07-09 RX ORDER — FAMOTIDINE 10 MG/1
10 TABLET, FILM COATED ORAL TWICE DAILY
Refills: 0 | Status: ACTIVE | COMMUNITY

## 2024-07-09 RX ORDER — ASPIRIN 81 MG/1
81 TABLET, COATED ORAL DAILY
Refills: 0 | Status: ACTIVE | COMMUNITY

## 2024-07-12 RX ORDER — RIVAROXABAN 20 MG/1
20 TABLET, FILM COATED ORAL DAILY
Qty: 90 | Refills: 1 | Status: ACTIVE | COMMUNITY
Start: 2024-07-12 | End: 1900-01-01

## 2024-07-12 RX ORDER — APIXABAN 5 MG/1
5 TABLET, FILM COATED ORAL
Qty: 60 | Refills: 3 | Status: DISCONTINUED | COMMUNITY
Start: 2024-07-09 | End: 2024-07-12

## 2024-07-29 ENCOUNTER — OFFICE (OUTPATIENT)
Dept: URBAN - METROPOLITAN AREA CLINIC 12 | Facility: CLINIC | Age: 59
Setting detail: OPHTHALMOLOGY
End: 2024-07-29
Payer: COMMERCIAL

## 2024-07-29 ENCOUNTER — RX ONLY (RX ONLY)
Age: 59
End: 2024-07-29

## 2024-07-29 DIAGNOSIS — H16.223: ICD-10-CM

## 2024-07-29 DIAGNOSIS — H25.13: ICD-10-CM

## 2024-07-29 PROCEDURE — 92004 COMPRE OPH EXAM NEW PT 1/>: CPT | Performed by: OPHTHALMOLOGY

## 2024-07-29 ASSESSMENT — CONFRONTATIONAL VISUAL FIELD TEST (CVF)
OS_FINDINGS: FULL
OD_FINDINGS: FULL

## 2024-08-09 ENCOUNTER — APPOINTMENT (OUTPATIENT)
Dept: ELECTROPHYSIOLOGY | Facility: CLINIC | Age: 59
End: 2024-08-09

## 2024-08-09 ENCOUNTER — NON-APPOINTMENT (OUTPATIENT)
Age: 59
End: 2024-08-09

## 2024-08-09 PROCEDURE — 93298 REM INTERROG DEV EVAL SCRMS: CPT

## 2024-09-13 ENCOUNTER — NON-APPOINTMENT (OUTPATIENT)
Age: 59
End: 2024-09-13

## 2024-09-13 ENCOUNTER — APPOINTMENT (OUTPATIENT)
Dept: ELECTROPHYSIOLOGY | Facility: CLINIC | Age: 59
End: 2024-09-13

## 2024-09-13 PROCEDURE — 93298 REM INTERROG DEV EVAL SCRMS: CPT

## 2024-09-19 ENCOUNTER — OFFICE (OUTPATIENT)
Dept: URBAN - METROPOLITAN AREA CLINIC 12 | Facility: CLINIC | Age: 59
Setting detail: OPHTHALMOLOGY
End: 2024-09-19
Payer: COMMERCIAL

## 2024-09-19 DIAGNOSIS — H25.13: ICD-10-CM

## 2024-09-19 DIAGNOSIS — H25.11: ICD-10-CM

## 2024-09-19 PROCEDURE — 99213 OFFICE O/P EST LOW 20 MIN: CPT | Performed by: OPHTHALMOLOGY

## 2024-09-19 PROCEDURE — 92136 OPHTHALMIC BIOMETRY: CPT | Performed by: OPHTHALMOLOGY

## 2024-09-19 ASSESSMENT — CONFRONTATIONAL VISUAL FIELD TEST (CVF)
OD_FINDINGS: FULL
OS_FINDINGS: FULL

## 2024-10-01 ENCOUNTER — ASC (OUTPATIENT)
Dept: URBAN - METROPOLITAN AREA SURGERY 8 | Facility: SURGERY | Age: 59
Setting detail: OPHTHALMOLOGY
End: 2024-10-01
Payer: COMMERCIAL

## 2024-10-01 DIAGNOSIS — H52.211: ICD-10-CM

## 2024-10-01 DIAGNOSIS — H25.11: ICD-10-CM

## 2024-10-01 PROCEDURE — FEMTO FEMTOSECOND LASER: Mod: GY | Performed by: OPHTHALMOLOGY

## 2024-10-01 PROCEDURE — 66984 XCAPSL CTRC RMVL W/O ECP: CPT | Mod: RT | Performed by: OPHTHALMOLOGY

## 2024-10-02 ENCOUNTER — RX ONLY (RX ONLY)
Age: 59
End: 2024-10-02

## 2024-10-02 ENCOUNTER — OFFICE (OUTPATIENT)
Dept: URBAN - METROPOLITAN AREA CLINIC 12 | Facility: CLINIC | Age: 59
Setting detail: OPHTHALMOLOGY
End: 2024-10-02
Payer: COMMERCIAL

## 2024-10-02 DIAGNOSIS — H25.12: ICD-10-CM

## 2024-10-02 PROCEDURE — 92136 OPHTHALMIC BIOMETRY: CPT | Performed by: OPHTHALMOLOGY

## 2024-10-02 ASSESSMENT — VISUAL ACUITY
OD_BCVA: 20/30-
OS_BCVA: 20/25+2

## 2024-10-02 ASSESSMENT — REFRACTION_MANIFEST
OS_AXIS: 080
OD_SPHERE: +0.75
OS_VA1: 20/25-2
OS_SPHERE: +0.25
OS_CYLINDER: -0.50
OD_VA1: 20/20-2
OD_AXIS: 095
OD_CYLINDER: -1.75

## 2024-10-02 ASSESSMENT — KERATOMETRY
OD_K1POWER_DIOPTERS: 39.50
OS_K2POWER_DIOPTERS: 40.00
OS_K1POWER_DIOPTERS: 39.50
OD_K2POWER_DIOPTERS: 40.50
OD_AXISANGLE_DEGREES: 153
METHOD_AUTO_MANUAL: AUTO
OS_AXISANGLE_DEGREES: 167

## 2024-10-02 ASSESSMENT — REFRACTION_AUTOREFRACTION
OS_SPHERE: -0.25
OS_CYLINDER: -0.25
OS_AXIS: 073
OD_CYLINDER: -0.75
OD_SPHERE: +0.25
OD_AXIS: 020

## 2024-10-02 ASSESSMENT — REFRACTION_CURRENTRX
OD_AXIS: 098
OS_OVR_VA: 20/
OD_OVR_VA: 20/
OS_VPRISM_DIRECTION: SV
OD_CYLINDER: -1.75
OD_VPRISM_DIRECTION: SV
OS_SPHERE: +1.00
OS_AXIS: 087
OD_SPHERE: +0.50
OS_CYLINDER: -1.25

## 2024-10-02 ASSESSMENT — TONOMETRY
OD_IOP_MMHG: 12
OS_IOP_MMHG: 12

## 2024-10-02 ASSESSMENT — CONFRONTATIONAL VISUAL FIELD TEST (CVF)
OD_FINDINGS: FULL
OS_FINDINGS: FULL

## 2024-10-02 ASSESSMENT — SUPERFICIAL PUNCTATE KERATITIS (SPK)
OS_SPK: T
OD_SPK: T

## 2024-10-08 ENCOUNTER — ASC (OUTPATIENT)
Dept: URBAN - METROPOLITAN AREA SURGERY 8 | Facility: SURGERY | Age: 59
Setting detail: OPHTHALMOLOGY
End: 2024-10-08
Payer: COMMERCIAL

## 2024-10-08 DIAGNOSIS — H52.212: ICD-10-CM

## 2024-10-08 DIAGNOSIS — H25.12: ICD-10-CM

## 2024-10-08 PROCEDURE — FEMTO FEMTOSECOND LASER: Mod: GY | Performed by: OPHTHALMOLOGY

## 2024-10-08 PROCEDURE — 66984 XCAPSL CTRC RMVL W/O ECP: CPT | Mod: 79,LT | Performed by: OPHTHALMOLOGY

## 2024-10-09 ENCOUNTER — OFFICE (OUTPATIENT)
Dept: URBAN - METROPOLITAN AREA CLINIC 12 | Facility: CLINIC | Age: 59
Setting detail: OPHTHALMOLOGY
End: 2024-10-09
Payer: COMMERCIAL

## 2024-10-09 DIAGNOSIS — Z96.1: ICD-10-CM

## 2024-10-09 PROBLEM — H25.12 CATARACT; LEFT EYE: Status: RESOLVED | Noted: 2024-10-02 | Resolved: 2024-10-09

## 2024-10-09 PROCEDURE — 99024 POSTOP FOLLOW-UP VISIT: CPT | Performed by: OPTOMETRIST

## 2024-10-09 ASSESSMENT — REFRACTION_CURRENTRX
OD_AXIS: 098
OS_SPHERE: +1.00
OD_CYLINDER: -1.75
OS_AXIS: 087
OS_VPRISM_DIRECTION: SV
OS_CYLINDER: -1.25
OD_SPHERE: +0.50
OD_VPRISM_DIRECTION: SV
OD_OVR_VA: 20/
OS_OVR_VA: 20/

## 2024-10-09 ASSESSMENT — REFRACTION_AUTOREFRACTION
OS_SPHERE: +0.50
OD_CYLINDER: -0.25
OD_AXIS: 020
OS_CYLINDER: -1.00
OS_AXIS: 083
OD_SPHERE: +0.50

## 2024-10-09 ASSESSMENT — CONFRONTATIONAL VISUAL FIELD TEST (CVF)
OS_FINDINGS: FULL
OD_FINDINGS: FULL

## 2024-10-09 ASSESSMENT — KERATOMETRY
OS_K1POWER_DIOPTERS: 39.25
METHOD_AUTO_MANUAL: AUTO
OD_K1POWER_DIOPTERS: 39.25
OS_K2POWER_DIOPTERS: 39.75
OS_AXISANGLE_DEGREES: 172
OD_AXISANGLE_DEGREES: 093
OD_K2POWER_DIOPTERS: 40.00

## 2024-10-09 ASSESSMENT — SUPERFICIAL PUNCTATE KERATITIS (SPK)
OD_SPK: T
OS_SPK: T

## 2024-10-09 ASSESSMENT — REFRACTION_MANIFEST
OS_SPHERE: +0.25
OS_CYLINDER: -0.50
OD_AXIS: 095
OS_VA1: 20/25-2
OD_VA1: 20/20-2
OS_AXIS: 080
OD_SPHERE: +0.75
OD_CYLINDER: -1.75

## 2024-10-09 ASSESSMENT — TONOMETRY
OS_IOP_MMHG: 19
OD_IOP_MMHG: 18

## 2024-10-09 ASSESSMENT — VISUAL ACUITY
OD_BCVA: 20/25-
OS_BCVA: 20/15

## 2024-10-16 ENCOUNTER — OFFICE (OUTPATIENT)
Dept: URBAN - METROPOLITAN AREA CLINIC 12 | Facility: CLINIC | Age: 59
Setting detail: OPHTHALMOLOGY
End: 2024-10-16
Payer: COMMERCIAL

## 2024-10-16 ENCOUNTER — RX ONLY (RX ONLY)
Age: 59
End: 2024-10-16

## 2024-10-16 DIAGNOSIS — Z96.1: ICD-10-CM

## 2024-10-16 PROCEDURE — 99024 POSTOP FOLLOW-UP VISIT: CPT | Performed by: OPTOMETRIST

## 2024-10-16 ASSESSMENT — REFRACTION_CURRENTRX
OS_SPHERE: +1.00
OS_AXIS: 087
OS_OVR_VA: 20/
OS_VPRISM_DIRECTION: SV
OD_OVR_VA: 20/
OD_CYLINDER: -1.75
OD_SPHERE: +0.50
OD_VPRISM_DIRECTION: SV
OS_CYLINDER: -1.25
OD_AXIS: 098

## 2024-10-16 ASSESSMENT — REFRACTION_MANIFEST
OD_AXIS: 095
OD_CYLINDER: -1.75
OD_SPHERE: +0.75
OS_SPHERE: +0.25
OS_CYLINDER: -0.50
OS_AXIS: 080
OD_VA1: 20/20-2
OS_VA1: 20/25-2

## 2024-10-16 ASSESSMENT — KERATOMETRY
OS_AXISANGLE_DEGREES: 162
OS_K1POWER_DIOPTERS: 39.00
METHOD_AUTO_MANUAL: AUTO
OD_K2POWER_DIOPTERS: 40.00
OD_K1POWER_DIOPTERS: 39.25
OD_AXISANGLE_DEGREES: 092
OS_K2POWER_DIOPTERS: 39.50

## 2024-10-16 ASSESSMENT — REFRACTION_AUTOREFRACTION
OD_SPHERE: +0.50
OD_CYLINDER: -0.25
OD_AXIS: 008
OS_AXIS: 082
OS_CYLINDER: -1.25
OS_SPHERE: +0.75

## 2024-10-16 ASSESSMENT — SUPERFICIAL PUNCTATE KERATITIS (SPK)
OS_SPK: T
OD_SPK: T

## 2024-10-16 ASSESSMENT — CONFRONTATIONAL VISUAL FIELD TEST (CVF)
OD_FINDINGS: FULL
OS_FINDINGS: FULL

## 2024-10-16 ASSESSMENT — VISUAL ACUITY
OS_BCVA: 20/15-
OD_BCVA: 20/25-

## 2024-10-16 ASSESSMENT — TONOMETRY
OS_IOP_MMHG: 18
OD_IOP_MMHG: 16

## 2024-10-18 ENCOUNTER — APPOINTMENT (OUTPATIENT)
Dept: ELECTROPHYSIOLOGY | Facility: CLINIC | Age: 59
End: 2024-10-18

## 2024-10-18 ENCOUNTER — NON-APPOINTMENT (OUTPATIENT)
Age: 59
End: 2024-10-18

## 2024-10-18 PROCEDURE — 93298 REM INTERROG DEV EVAL SCRMS: CPT

## 2024-10-30 ENCOUNTER — OUTPATIENT (OUTPATIENT)
Dept: OUTPATIENT SERVICES | Facility: HOSPITAL | Age: 59
LOS: 1 days | End: 2024-10-30
Payer: COMMERCIAL

## 2024-10-30 VITALS
SYSTOLIC BLOOD PRESSURE: 118 MMHG | HEART RATE: 56 BPM | HEIGHT: 71 IN | OXYGEN SATURATION: 100 % | WEIGHT: 179.9 LBS | TEMPERATURE: 99 F | DIASTOLIC BLOOD PRESSURE: 72 MMHG | RESPIRATION RATE: 18 BRPM

## 2024-10-30 DIAGNOSIS — I48.0 PAROXYSMAL ATRIAL FIBRILLATION: ICD-10-CM

## 2024-10-30 DIAGNOSIS — Z98.890 OTHER SPECIFIED POSTPROCEDURAL STATES: Chronic | ICD-10-CM

## 2024-10-30 DIAGNOSIS — Z01.818 ENCOUNTER FOR OTHER PREPROCEDURAL EXAMINATION: ICD-10-CM

## 2024-10-30 DIAGNOSIS — Z95.818 PRESENCE OF OTHER CARDIAC IMPLANTS AND GRAFTS: Chronic | ICD-10-CM

## 2024-10-30 PROBLEM — I48.91 UNSPECIFIED ATRIAL FIBRILLATION: Chronic | Status: INACTIVE | Noted: 2023-11-30 | Resolved: 2024-10-30

## 2024-10-30 LAB
ANION GAP SERPL CALC-SCNC: 13 MMOL/L — SIGNIFICANT CHANGE UP (ref 5–17)
BLD GP AB SCN SERPL QL: NEGATIVE — SIGNIFICANT CHANGE UP
BUN SERPL-MCNC: 30 MG/DL — HIGH (ref 7–23)
CALCIUM SERPL-MCNC: 9.1 MG/DL — SIGNIFICANT CHANGE UP (ref 8.4–10.5)
CHLORIDE SERPL-SCNC: 105 MMOL/L — SIGNIFICANT CHANGE UP (ref 96–108)
CO2 SERPL-SCNC: 24 MMOL/L — SIGNIFICANT CHANGE UP (ref 22–31)
CREAT SERPL-MCNC: 1.01 MG/DL — SIGNIFICANT CHANGE UP (ref 0.5–1.3)
EGFR: 85 ML/MIN/1.73M2 — SIGNIFICANT CHANGE UP
GLUCOSE SERPL-MCNC: 101 MG/DL — HIGH (ref 70–99)
HCT VFR BLD CALC: 40.8 % — SIGNIFICANT CHANGE UP (ref 39–50)
HGB BLD-MCNC: 13.4 G/DL — SIGNIFICANT CHANGE UP (ref 13–17)
MCHC RBC-ENTMCNC: 30.2 PG — SIGNIFICANT CHANGE UP (ref 27–34)
MCHC RBC-ENTMCNC: 32.8 G/DL — SIGNIFICANT CHANGE UP (ref 32–36)
MCV RBC AUTO: 92.1 FL — SIGNIFICANT CHANGE UP (ref 80–100)
NRBC # BLD: 0 /100 WBCS — SIGNIFICANT CHANGE UP (ref 0–0)
PLATELET # BLD AUTO: 254 K/UL — SIGNIFICANT CHANGE UP (ref 150–400)
POTASSIUM SERPL-MCNC: 4.3 MMOL/L — SIGNIFICANT CHANGE UP (ref 3.5–5.3)
POTASSIUM SERPL-SCNC: 4.3 MMOL/L — SIGNIFICANT CHANGE UP (ref 3.5–5.3)
RBC # BLD: 4.43 M/UL — SIGNIFICANT CHANGE UP (ref 4.2–5.8)
RBC # FLD: 13.2 % — SIGNIFICANT CHANGE UP (ref 10.3–14.5)
RH IG SCN BLD-IMP: POSITIVE — SIGNIFICANT CHANGE UP
SODIUM SERPL-SCNC: 142 MMOL/L — SIGNIFICANT CHANGE UP (ref 135–145)
WBC # BLD: 4.54 K/UL — SIGNIFICANT CHANGE UP (ref 3.8–10.5)
WBC # FLD AUTO: 4.54 K/UL — SIGNIFICANT CHANGE UP (ref 3.8–10.5)

## 2024-10-30 PROCEDURE — 86900 BLOOD TYPING SEROLOGIC ABO: CPT

## 2024-10-30 PROCEDURE — 86850 RBC ANTIBODY SCREEN: CPT

## 2024-10-30 PROCEDURE — 85027 COMPLETE CBC AUTOMATED: CPT

## 2024-10-30 PROCEDURE — 86901 BLOOD TYPING SEROLOGIC RH(D): CPT

## 2024-10-30 PROCEDURE — 80048 BASIC METABOLIC PNL TOTAL CA: CPT

## 2024-10-30 PROCEDURE — G0463: CPT

## 2024-10-30 RX ORDER — PROPRANOLOL HCL 60 MG
1 TABLET ORAL
Refills: 0 | DISCHARGE

## 2024-10-30 NOTE — H&P PST ADULT - NSICDXPASTSURGICALHX_GEN_ALL_CORE_FT
PAST SURGICAL HISTORY:  Implantable loop recorder present     Status post laser ablation of incompetent vein     Status post phlebectomy

## 2024-10-30 NOTE — H&P PST ADULT - NSICDXFAMILYHX_GEN_ALL_CORE_FT
FAMILY HISTORY:  Family history of cardiac arrest, mother, age 70s  Family history of pancreatic cancer, father  FH: CAD (coronary artery disease), not premature  FH: heart disease, parents

## 2024-10-30 NOTE — H&P PST ADULT - NSICDXPASTMEDICALHX_GEN_ALL_CORE_FT
PAST MEDICAL HISTORY:  Atrial fibrillation     History of alopecia     HLD (hyperlipidemia)     Paroxysmal atrial fibrillation

## 2024-10-30 NOTE — H&P PST ADULT - OPHTHALMOLOGIC
You have an ectopic pregnancy  Important information  If you do have an ectopic pregnancy, there is a small chance that the growing fetus can tear the fallopian tube. This can cause severe internal bleeding. If this happens, you may have:   Sudden severe pain in your lower abdomen  If you develop Vaginal bleeding  Weakness, dizziness, and sometimes fainting  If any of these symptoms occur:  Call 911or return right away to the hospital.  Don't drive yourself.  Don't go to your healthcare provider's office or to a clinic. Go to the hospital.   negative

## 2024-10-30 NOTE — H&P PST ADULT - HISTORY OF PRESENT ILLNESS
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 After discharge, he wore a BIOTEL which revealed no further arrythmias. In November/December 2023, he had intermittent episodes of near-syncope as well as sensations of missed beats; s/p ILR implantation on 1/10/2024. He now presents to PST for a scheduled AFib ablation w/ [employer] on 11/8/2024. Denies SOB, cough, fevers, chills, weight changes, rash, swelling, dysuria, hematuria, diarrhea, blood in stool, abdominal pain, nausea, vomiting, headache, or blurry vision.

## 2024-11-04 ENCOUNTER — NON-APPOINTMENT (OUTPATIENT)
Age: 59
End: 2024-11-04

## 2024-11-08 ENCOUNTER — TRANSCRIPTION ENCOUNTER (OUTPATIENT)
Age: 59
End: 2024-11-08

## 2024-11-08 ENCOUNTER — INPATIENT (INPATIENT)
Facility: HOSPITAL | Age: 59
LOS: 0 days | Discharge: HOME | DRG: 274 | End: 2024-11-08
Attending: INTERNAL MEDICINE | Admitting: INTERNAL MEDICINE
Payer: COMMERCIAL

## 2024-11-08 VITALS
RESPIRATION RATE: 18 BRPM | TEMPERATURE: 98 F | HEART RATE: 48 BPM | DIASTOLIC BLOOD PRESSURE: 81 MMHG | OXYGEN SATURATION: 98 % | HEIGHT: 71 IN | WEIGHT: 179.9 LBS | SYSTOLIC BLOOD PRESSURE: 136 MMHG

## 2024-11-08 VITALS
RESPIRATION RATE: 18 BRPM | TEMPERATURE: 98 F | SYSTOLIC BLOOD PRESSURE: 123 MMHG | DIASTOLIC BLOOD PRESSURE: 76 MMHG | HEART RATE: 65 BPM

## 2024-11-08 DIAGNOSIS — I48.0 PAROXYSMAL ATRIAL FIBRILLATION: ICD-10-CM

## 2024-11-08 DIAGNOSIS — Z98.890 OTHER SPECIFIED POSTPROCEDURAL STATES: Chronic | ICD-10-CM

## 2024-11-08 DIAGNOSIS — Z95.818 PRESENCE OF OTHER CARDIAC IMPLANTS AND GRAFTS: Chronic | ICD-10-CM

## 2024-11-08 PROCEDURE — 93010 ELECTROCARDIOGRAM REPORT: CPT

## 2024-11-08 PROCEDURE — C1733: CPT

## 2024-11-08 PROCEDURE — 93657 TX L/R ATRIAL FIB ADDL: CPT

## 2024-11-08 PROCEDURE — C1732: CPT

## 2024-11-08 PROCEDURE — C1769: CPT

## 2024-11-08 PROCEDURE — C1894: CPT

## 2024-11-08 PROCEDURE — C1759: CPT

## 2024-11-08 PROCEDURE — 93656 COMPRE EP EVAL ABLTJ ATR FIB: CPT

## 2024-11-08 PROCEDURE — 93005 ELECTROCARDIOGRAM TRACING: CPT

## 2024-11-08 PROCEDURE — C1730: CPT

## 2024-11-08 PROCEDURE — C1760: CPT

## 2024-11-08 PROCEDURE — C9399: CPT

## 2024-11-08 PROCEDURE — C1766: CPT

## 2024-11-08 PROCEDURE — 93623 PRGRMD STIMJ&PACG IV RX NFS: CPT | Mod: 26

## 2024-11-08 PROCEDURE — C1893: CPT

## 2024-11-08 RX ORDER — RIVAROXABAN 20 MG/1
1 TABLET, FILM COATED ORAL
Qty: 0 | Refills: 0 | DISCHARGE

## 2024-11-08 RX ORDER — APIXABAN 5 MG/1
1 TABLET, FILM COATED ORAL
Refills: 0 | DISCHARGE

## 2024-11-08 NOTE — SBIRT NOTE ADULT - NSSBIRTUNABLESCROTHER_GEN_A_CORE
Patient's reported ETOH consumption is within normal limits.  Total score of 4 and no illicit substance use.

## 2024-11-08 NOTE — ASU DISCHARGE PLAN (ADULT/PEDIATRIC) - PROVIDER TOKENS
PROVIDER:[TOKEN:[[id number]],SCHEDULEDAPPT:[12/24/2024],SCHEDULEDAPPTTIME:[02:30 PM],ESTABLISHEDPATIENT:[T]]

## 2024-11-08 NOTE — ASU DISCHARGE PLAN (ADULT/PEDIATRIC) - FINANCIAL ASSISTANCE
[facility] provides services at a reduced cost to those who are determined to be eligible through [facility]’s financial assistance program. Information regarding [facility]’s financial assistance program can be found by going to [email or web address] or by calling [phone].

## 2024-11-08 NOTE — ASU DISCHARGE PLAN (ADULT/PEDIATRIC) - NS MD DC FALL RISK RISK
For information on Fall & Injury Prevention, visit: [email or web address] OR  [email or web address]://[email or web address] OR  https://www.cdc.gov/steadi/patient.html

## 2024-11-08 NOTE — ASU DISCHARGE PLAN (ADULT/PEDIATRIC) - CARE PROVIDER_API CALL
Rohit Jung.  Cardiac Electrophysiology  [address] [place] [id number]  Phone: [phone]  Fax: [phone]  Established Patient  Scheduled Appointment: 12/24/2024 02:30 PM

## 2024-11-08 NOTE — ASU DISCHARGE PLAN (ADULT/PEDIATRIC) - ASU DC SPECIAL INSTRUCTIONSFT
WOUND CARE:  The day AFTER your procedure  - Remove the bandage at the site GENTLY, clean with mild soap and water, and pat dry; leave open to air  - You may shower   - DO NOT apply lotions, creams, ointments, powder, parfumes to your incision site  -Check your groin every day. A small amount of bruising or soarness is normal, a bump ( smaller than nickel) might be present, normal  - DO NOT SOAK your site for 1 week ( no baths, no pools, no tubs, etc..)    ACTIVITY:  YOur procedure was done through your groin  for the next 5 DAYS:  - Limit climbing stairs, no strenous activity, pushing , pulling, or straining   DO NOT LIFT anything 10 lbs or heavier   you may resume sexual activity in 7 days, unless instructed otherwise  mild palpitations are normal     Follow heart healthy diet reccomended by your doctor, , if you smoke STOP SMOKING ( may call [phone] for center of tobacco control if you need assistance).  for the next 24 hours:   - stay at home and rest, do not drive or operate heavy Inktdary   do not drink alcoholic beverages   do not make important personal or business decisions     ***CALL YOUR DOCTOR ***  IF you have fever, chils, body aches, or severe pain, swelling, redness, heat, yellow drainage from your incision site  IF bleeding  or significant new swelling from your puncture site  IF you experience rapid heartbeat or palpitations that cause: lightheadness, dizziness, or fainting spell.  If you eperience difficulty swallowing, or pain with swallowing   IF unable to ge tin contact with yout doctor, you may call the Cardiology Office at [facility] at [phone]

## 2024-11-11 ENCOUNTER — TRANSCRIPTION ENCOUNTER (OUTPATIENT)
Age: 59
End: 2024-11-11

## 2024-11-12 PROBLEM — I48.0 PAROXYSMAL ATRIAL FIBRILLATION: Chronic | Status: ACTIVE | Noted: 2024-10-30

## 2024-11-12 PROBLEM — E78.5 HYPERLIPIDEMIA, UNSPECIFIED: Chronic | Status: ACTIVE | Noted: 2024-10-30

## 2024-11-13 ENCOUNTER — OFFICE (OUTPATIENT)
Dept: URBAN - METROPOLITAN AREA CLINIC 12 | Facility: CLINIC | Age: 59
Setting detail: OPHTHALMOLOGY
End: 2024-11-13
Payer: COMMERCIAL

## 2024-11-13 DIAGNOSIS — Z96.1: ICD-10-CM

## 2024-11-13 PROCEDURE — 99024 POSTOP FOLLOW-UP VISIT: CPT | Performed by: OPHTHALMOLOGY

## 2024-11-13 ASSESSMENT — REFRACTION_CURRENTRX
OS_AXIS: 087
OD_OVR_VA: 20/
OS_CYLINDER: -1.25
OD_CYLINDER: -1.75
OS_OVR_VA: 20/
OD_SPHERE: +0.50
OD_VPRISM_DIRECTION: SV
OS_VPRISM_DIRECTION: SV
OS_SPHERE: +1.00
OD_AXIS: 098

## 2024-11-13 ASSESSMENT — TONOMETRY
OD_IOP_MMHG: 10
OS_IOP_MMHG: 10

## 2024-11-13 ASSESSMENT — REFRACTION_MANIFEST
OS_AXIS: 080
OS_VA1: 20/20
OS_VA1: 20/25-2
OS_CYLINDER: -0.50
OD_VA1: 20/20-2
OD_CYLINDER: -1.75
OS_SPHERE: +0.25
OS_CYLINDER: -1.00
OD_AXIS: 095
OS_SPHERE: +0.25
OD_SPHERE: +0.75
OS_AXIS: 085

## 2024-11-13 ASSESSMENT — VISUAL ACUITY
OD_BCVA: 20/25-
OS_BCVA: 20/15

## 2024-11-13 ASSESSMENT — REFRACTION_AUTOREFRACTION
OS_CYLINDER: -1.00
OD_SPHERE: +0.25
OS_SPHERE: +0.50
OD_AXIS: 083
OS_AXIS: 083
OD_CYLINDER: -0.50

## 2024-11-13 ASSESSMENT — KERATOMETRY
METHOD_AUTO_MANUAL: AUTO
OS_AXISANGLE_DEGREES: 170
OD_K1POWER_DIOPTERS: 40.00
OS_K1POWER_DIOPTERS: 39.00
OS_K2POWER_DIOPTERS: 39.50
OD_K2POWER_DIOPTERS: 40.00
OD_AXISANGLE_DEGREES: 090

## 2024-11-13 ASSESSMENT — CONFRONTATIONAL VISUAL FIELD TEST (CVF)
OS_FINDINGS: FULL
OD_FINDINGS: FULL

## 2024-11-13 ASSESSMENT — SUPERFICIAL PUNCTATE KERATITIS (SPK)
OS_SPK: T
OD_SPK: T

## 2024-11-22 ENCOUNTER — APPOINTMENT (OUTPATIENT)
Dept: ELECTROPHYSIOLOGY | Facility: CLINIC | Age: 59
End: 2024-11-22

## 2024-11-22 ENCOUNTER — NON-APPOINTMENT (OUTPATIENT)
Age: 59
End: 2024-11-22

## 2024-11-22 PROCEDURE — 93298 REM INTERROG DEV EVAL SCRMS: CPT

## 2024-12-16 ENCOUNTER — LABORATORY RESULT (OUTPATIENT)
Age: 59
End: 2024-12-16

## 2024-12-16 ENCOUNTER — APPOINTMENT (OUTPATIENT)
Dept: DERMATOLOGY | Facility: CLINIC | Age: 59
End: 2024-12-16
Payer: COMMERCIAL

## 2024-12-16 DIAGNOSIS — D48.5 NEOPLASM OF UNCERTAIN BEHAVIOR OF SKIN: ICD-10-CM

## 2024-12-16 DIAGNOSIS — L81.4 OTHER MELANIN HYPERPIGMENTATION: ICD-10-CM

## 2024-12-16 DIAGNOSIS — Z12.83 ENCOUNTER FOR SCREENING FOR MALIGNANT NEOPLASM OF SKIN: ICD-10-CM

## 2024-12-16 DIAGNOSIS — D22.9 MELANOCYTIC NEVI, UNSPECIFIED: ICD-10-CM

## 2024-12-16 DIAGNOSIS — B35.1 TINEA UNGUIUM: ICD-10-CM

## 2024-12-16 PROCEDURE — 99203 OFFICE O/P NEW LOW 30 MIN: CPT | Mod: 25

## 2024-12-16 PROCEDURE — 11102 TANGNTL BX SKIN SINGLE LES: CPT

## 2024-12-16 RX ORDER — CICLOPIROX 71.3 MG/ML
8 SOLUTION TOPICAL
Qty: 1 | Refills: 3 | Status: ACTIVE | COMMUNITY
Start: 2024-12-16 | End: 1900-01-01

## 2024-12-17 ENCOUNTER — APPOINTMENT (OUTPATIENT)
Dept: ELECTROPHYSIOLOGY | Facility: CLINIC | Age: 59
End: 2024-12-17
Payer: COMMERCIAL

## 2024-12-17 ENCOUNTER — NON-APPOINTMENT (OUTPATIENT)
Age: 59
End: 2024-12-17

## 2024-12-17 VITALS
BODY MASS INDEX: 25.2 KG/M2 | WEIGHT: 180 LBS | DIASTOLIC BLOOD PRESSURE: 77 MMHG | HEART RATE: 52 BPM | HEIGHT: 71 IN | OXYGEN SATURATION: 100 % | SYSTOLIC BLOOD PRESSURE: 127 MMHG

## 2024-12-17 DIAGNOSIS — I48.0 PAROXYSMAL ATRIAL FIBRILLATION: ICD-10-CM

## 2024-12-17 PROCEDURE — 93285 PRGRMG DEV EVAL SCRMS IP: CPT

## 2024-12-17 PROCEDURE — 99213 OFFICE O/P EST LOW 20 MIN: CPT

## 2024-12-23 ENCOUNTER — NON-APPOINTMENT (OUTPATIENT)
Age: 59
End: 2024-12-23

## 2024-12-23 RX ORDER — HYALURONATE SODIUM 20 MG/2 ML
20 SYRINGE (ML) INTRAARTICULAR
Qty: 1 | Refills: 0 | Status: ACTIVE | COMMUNITY
Start: 2024-12-23

## 2024-12-24 ENCOUNTER — APPOINTMENT (OUTPATIENT)
Dept: ELECTROPHYSIOLOGY | Facility: CLINIC | Age: 59
End: 2024-12-24

## 2025-01-02 ENCOUNTER — NON-APPOINTMENT (OUTPATIENT)
Age: 60
End: 2025-01-02

## 2025-01-06 ENCOUNTER — RESULT REVIEW (OUTPATIENT)
Age: 60
End: 2025-01-06

## 2025-01-06 ENCOUNTER — OUTPATIENT (OUTPATIENT)
Dept: OUTPATIENT SERVICES | Facility: HOSPITAL | Age: 60
LOS: 1 days | End: 2025-01-06
Payer: COMMERCIAL

## 2025-01-06 DIAGNOSIS — Z98.890 OTHER SPECIFIED POSTPROCEDURAL STATES: Chronic | ICD-10-CM

## 2025-01-06 DIAGNOSIS — Z95.818 PRESENCE OF OTHER CARDIAC IMPLANTS AND GRAFTS: Chronic | ICD-10-CM

## 2025-01-06 DIAGNOSIS — I48.0 PAROXYSMAL ATRIAL FIBRILLATION: ICD-10-CM

## 2025-01-06 PROCEDURE — 93926 LOWER EXTREMITY STUDY: CPT | Mod: 26,RT

## 2025-01-06 PROCEDURE — 93926 LOWER EXTREMITY STUDY: CPT

## 2025-01-08 ENCOUNTER — RX RENEWAL (OUTPATIENT)
Age: 60
End: 2025-01-08

## 2025-01-09 ENCOUNTER — APPOINTMENT (OUTPATIENT)
Dept: VASCULAR SURGERY | Facility: CLINIC | Age: 60
End: 2025-01-09
Payer: COMMERCIAL

## 2025-01-09 PROCEDURE — 99204 OFFICE O/P NEW MOD 45 MIN: CPT

## 2025-01-09 PROCEDURE — 93971 EXTREMITY STUDY: CPT | Mod: RT

## 2025-01-10 ENCOUNTER — APPOINTMENT (OUTPATIENT)
Dept: RADIOLOGY | Facility: CLINIC | Age: 60
End: 2025-01-10
Payer: COMMERCIAL

## 2025-01-10 ENCOUNTER — APPOINTMENT (OUTPATIENT)
Dept: MRI IMAGING | Facility: CLINIC | Age: 60
End: 2025-01-10
Payer: COMMERCIAL

## 2025-01-10 ENCOUNTER — OUTPATIENT (OUTPATIENT)
Dept: OUTPATIENT SERVICES | Facility: HOSPITAL | Age: 60
LOS: 1 days | End: 2025-01-10
Payer: COMMERCIAL

## 2025-01-10 ENCOUNTER — APPOINTMENT (OUTPATIENT)
Dept: CT IMAGING | Facility: CLINIC | Age: 60
End: 2025-01-10
Payer: COMMERCIAL

## 2025-01-10 DIAGNOSIS — Z98.890 OTHER SPECIFIED POSTPROCEDURAL STATES: Chronic | ICD-10-CM

## 2025-01-10 DIAGNOSIS — Z95.818 PRESENCE OF OTHER CARDIAC IMPLANTS AND GRAFTS: Chronic | ICD-10-CM

## 2025-01-10 DIAGNOSIS — S83.222D PERIPHERAL TEAR OF MEDIAL MENISCUS, CURRENT INJURY, LEFT KNEE, SUBSEQUENT ENCOUNTER: ICD-10-CM

## 2025-01-10 DIAGNOSIS — Z00.8 ENCOUNTER FOR OTHER GENERAL EXAMINATION: ICD-10-CM

## 2025-01-10 PROCEDURE — 74177 CT ABD & PELVIS W/CONTRAST: CPT | Mod: 26

## 2025-01-10 PROCEDURE — 71046 X-RAY EXAM CHEST 2 VIEWS: CPT | Mod: 26

## 2025-01-10 PROCEDURE — 73721 MRI JNT OF LWR EXTRE W/O DYE: CPT | Mod: 26,LT

## 2025-01-10 PROCEDURE — 74177 CT ABD & PELVIS W/CONTRAST: CPT

## 2025-01-10 PROCEDURE — 73721 MRI JNT OF LWR EXTRE W/O DYE: CPT

## 2025-01-10 PROCEDURE — 71046 X-RAY EXAM CHEST 2 VIEWS: CPT

## 2025-01-24 ENCOUNTER — APPOINTMENT (OUTPATIENT)
Dept: ELECTROPHYSIOLOGY | Facility: CLINIC | Age: 60
End: 2025-01-24

## 2025-01-24 PROCEDURE — 93298 REM INTERROG DEV EVAL SCRMS: CPT

## 2025-02-03 ENCOUNTER — APPOINTMENT (OUTPATIENT)
Facility: CLINIC | Age: 60
End: 2025-02-03
Payer: COMMERCIAL

## 2025-02-03 VITALS — BODY MASS INDEX: 25.2 KG/M2 | WEIGHT: 180 LBS | HEIGHT: 71 IN

## 2025-02-03 DIAGNOSIS — S83.222D PERIPHERAL TEAR OF MEDIAL MENISCUS, CURRENT INJURY, LEFT KNEE, SUBSEQUENT ENCOUNTER: ICD-10-CM

## 2025-02-03 PROCEDURE — 73564 X-RAY EXAM KNEE 4 OR MORE: CPT | Mod: LT

## 2025-02-03 PROCEDURE — 99204 OFFICE O/P NEW MOD 45 MIN: CPT

## 2025-02-10 ENCOUNTER — OFFICE (OUTPATIENT)
Dept: URBAN - METROPOLITAN AREA CLINIC 12 | Facility: CLINIC | Age: 60
Setting detail: OPHTHALMOLOGY
End: 2025-02-10
Payer: COMMERCIAL

## 2025-02-10 DIAGNOSIS — H16.223: ICD-10-CM

## 2025-02-10 PROCEDURE — 92014 COMPRE OPH EXAM EST PT 1/>: CPT | Performed by: OPHTHALMOLOGY

## 2025-02-10 ASSESSMENT — REFRACTION_MANIFEST
OS_SPHERE: +0.25
OD_SPHERE: +0.75
OD_CYLINDER: -0.75
OD_AXIS: 090
OD_CYLINDER: -1.75
OS_SPHERE: +0.25
OS_CYLINDER: -1.00
OS_VA1: 20/NI
OD_SPHERE: +0.25
OS_AXIS: 085
OS_SPHERE: +0.25
OS_CYLINDER: -0.50
OD_VA1: 20/NI
OS_AXIS: 080
OS_CYLINDER: -1.00
OD_VA1: 20/20-2
OS_VA1: 20/20
OS_AXIS: 095
OS_VA1: 20/25-2
OD_AXIS: 095

## 2025-02-10 ASSESSMENT — REFRACTION_CURRENTRX
OD_OVR_VA: 20/
OS_CYLINDER: -1.25
OD_SPHERE: +0.50
OD_VPRISM_DIRECTION: SV
OS_SPHERE: +1.00
OS_VPRISM_DIRECTION: SV
OD_AXIS: 098
OS_AXIS: 087
OS_OVR_VA: 20/
OD_CYLINDER: -1.75

## 2025-02-10 ASSESSMENT — TONOMETRY
OD_IOP_MMHG: 12
OS_IOP_MMHG: 10

## 2025-02-10 ASSESSMENT — VISUAL ACUITY
OD_BCVA: 20/25-1
OS_BCVA: 20/20

## 2025-02-10 ASSESSMENT — KERATOMETRY
OS_AXISANGLE_DEGREES: 176
OS_K2POWER_DIOPTERS: 39.75
OS_K1POWER_DIOPTERS: 39.25
OD_K1POWER_DIOPTERS: 40.00
OD_AXISANGLE_DEGREES: 090
OD_K2POWER_DIOPTERS: 40.00
METHOD_AUTO_MANUAL: AUTO

## 2025-02-10 ASSESSMENT — SUPERFICIAL PUNCTATE KERATITIS (SPK)
OD_SPK: T
OS_SPK: T

## 2025-02-10 ASSESSMENT — REFRACTION_AUTOREFRACTION
OS_SPHERE: +0.25
OS_CYLINDER: -1.00
OD_AXIS: 089
OD_SPHERE: +0.25
OS_AXIS: 093
OD_CYLINDER: -0.75

## 2025-02-10 ASSESSMENT — CONFRONTATIONAL VISUAL FIELD TEST (CVF)
OS_FINDINGS: FULL
OD_FINDINGS: FULL

## 2025-02-27 ENCOUNTER — APPOINTMENT (OUTPATIENT)
Dept: ELECTROPHYSIOLOGY | Facility: CLINIC | Age: 60
End: 2025-02-27

## 2025-02-27 PROCEDURE — 93298 REM INTERROG DEV EVAL SCRMS: CPT

## 2025-04-03 ENCOUNTER — APPOINTMENT (OUTPATIENT)
Dept: ELECTROPHYSIOLOGY | Facility: CLINIC | Age: 60
End: 2025-04-03
Payer: COMMERCIAL

## 2025-04-03 ENCOUNTER — NON-APPOINTMENT (OUTPATIENT)
Age: 60
End: 2025-04-03

## 2025-04-03 PROCEDURE — 93298 REM INTERROG DEV EVAL SCRMS: CPT

## 2025-04-15 ENCOUNTER — TRANSCRIPTION ENCOUNTER (OUTPATIENT)
Age: 60
End: 2025-04-15

## 2025-05-08 ENCOUNTER — NON-APPOINTMENT (OUTPATIENT)
Age: 60
End: 2025-05-08

## 2025-05-08 ENCOUNTER — APPOINTMENT (OUTPATIENT)
Dept: ELECTROPHYSIOLOGY | Facility: CLINIC | Age: 60
End: 2025-05-08

## 2025-05-08 PROCEDURE — 93298 REM INTERROG DEV EVAL SCRMS: CPT

## 2025-06-12 ENCOUNTER — APPOINTMENT (OUTPATIENT)
Dept: ELECTROPHYSIOLOGY | Facility: CLINIC | Age: 60
End: 2025-06-12

## 2025-06-12 PROCEDURE — 93298 REM INTERROG DEV EVAL SCRMS: CPT

## 2025-07-17 ENCOUNTER — APPOINTMENT (OUTPATIENT)
Dept: ELECTROPHYSIOLOGY | Facility: CLINIC | Age: 60
End: 2025-07-17

## 2025-07-17 ENCOUNTER — NON-APPOINTMENT (OUTPATIENT)
Age: 60
End: 2025-07-17

## 2025-07-17 PROCEDURE — 93298 REM INTERROG DEV EVAL SCRMS: CPT

## 2025-08-21 ENCOUNTER — APPOINTMENT (OUTPATIENT)
Dept: ELECTROPHYSIOLOGY | Facility: CLINIC | Age: 60
End: 2025-08-21

## 2025-08-21 PROCEDURE — 93298 REM INTERROG DEV EVAL SCRMS: CPT

## (undated) DEVICE — SYR LUER LOK 50CC

## (undated) DEVICE — CLAMP BX HOT RAD JAW 3

## (undated) DEVICE — BIOPSY FORCEP RADIAL JAW 4 STANDARD WITH NEEDLE

## (undated) DEVICE — FOLEY HOLDER STATLOCK 2 WAY ADULT

## (undated) DEVICE — FORCEP MULTIBITE MULTIPLE SAMPLE 2.4MM 2.8MM 240CM ORANGE DISP

## (undated) DEVICE — PACK IV START WITH CHG

## (undated) DEVICE — TUBING CAP SET ENDO 24HR USE GI

## (undated) DEVICE — TUBING SUCTION CONN 6FT STERILE

## (undated) DEVICE — BALLOON US ENDO

## (undated) DEVICE — POLY TRAP ETRAP

## (undated) DEVICE — IRRIGATOR BIO SHIELD

## (undated) DEVICE — Device

## (undated) DEVICE — CATH IV SAFE BC 22G X 1" (BLUE)

## (undated) DEVICE — TUBING SUCTION 20FT

## (undated) DEVICE — FORCEP RADIAL JAW 4 JUMBO 2.8MM 3.2MM 240CM ORANGE DISP

## (undated) DEVICE — BITE BLOCK ADULT 20 X 27MM (GREEN)

## (undated) DEVICE — SENSOR O2 FINGER ADULT

## (undated) DEVICE — CATH IV SAFE BC 20G X 1.16" (PINK)

## (undated) DEVICE — SUCTION YANKAUER NO CONTROL VENT

## (undated) DEVICE — SYR ALLIANCE II INFLATION 60ML

## (undated) DEVICE — ELCTR GROUNDING PAD ADULT COVIDIEN

## (undated) DEVICE — TUBING IV SET GRAVITY 3Y 100" MACRO

## (undated) DEVICE — SNARE CAPTIVATOR COLD RND STIFF 10X2.4X2.8MM 240CM

## (undated) DEVICE — SOL INJ NS 0.9% 500ML 2 PORT

## (undated) DEVICE — BRUSH COLONOSCOPY CYTOLOGY